# Patient Record
Sex: FEMALE | Race: WHITE | Employment: FULL TIME | URBAN - METROPOLITAN AREA
[De-identification: names, ages, dates, MRNs, and addresses within clinical notes are randomized per-mention and may not be internally consistent; named-entity substitution may affect disease eponyms.]

---

## 2017-02-23 ENCOUNTER — HOSPITAL ENCOUNTER (OUTPATIENT)
Dept: MAMMOGRAPHY | Age: 42
Discharge: HOME | End: 2017-02-23
Attending: OBSTETRICS & GYNECOLOGY
Payer: COMMERCIAL

## 2017-02-23 DIAGNOSIS — N63.20 LEFT BREAST MASS: ICD-10-CM

## 2017-02-23 PROCEDURE — 76642 ULTRASOUND BREAST LIMITED: CPT

## 2017-02-23 PROCEDURE — 77066 DX MAMMO INCL CAD BI: CPT

## 2017-03-18 NOTE — PROGRESS NOTES
Notify patient MMG and breast US  are nl, keep an eye on her SBE, let us know if anything changes or she has concerns

## 2018-02-22 ENCOUNTER — HOSPITAL ENCOUNTER (OUTPATIENT)
Dept: MAMMOGRAPHY | Age: 43
Discharge: HOME | End: 2018-02-22
Attending: OBSTETRICS & GYNECOLOGY
Payer: COMMERCIAL

## 2018-02-22 DIAGNOSIS — N63.0 BREAST MASS: ICD-10-CM

## 2018-02-22 DIAGNOSIS — Z12.39 SCREENING FOR BREAST CANCER: ICD-10-CM

## 2018-02-22 PROCEDURE — 76642 ULTRASOUND BREAST LIMITED: CPT

## 2018-02-22 PROCEDURE — 77066 DX MAMMO INCL CAD BI: CPT

## 2019-04-04 PROBLEM — E66.01 SEVERE OBESITY (HCC): Status: ACTIVE | Noted: 2019-04-04

## 2019-05-15 ENCOUNTER — HOSPITAL ENCOUNTER (OUTPATIENT)
Dept: MAMMOGRAPHY | Age: 44
Discharge: HOME | End: 2019-05-15
Attending: OBSTETRICS & GYNECOLOGY
Payer: COMMERCIAL

## 2019-05-15 DIAGNOSIS — Z12.31 VISIT FOR SCREENING MAMMOGRAM: ICD-10-CM

## 2019-05-15 PROCEDURE — 77063 BREAST TOMOSYNTHESIS BI: CPT

## 2020-01-15 ENCOUNTER — HOSPITAL ENCOUNTER (OUTPATIENT)
Dept: SURGERY | Age: 45
Discharge: HOME | End: 2020-01-15

## 2020-01-17 VITALS — HEIGHT: 62 IN | BODY MASS INDEX: 33.13 KG/M2 | WEIGHT: 180 LBS

## 2020-01-17 RX ORDER — LORATADINE 10 MG/1
10 TABLET ORAL DAILY
COMMUNITY

## 2020-01-17 NOTE — PERIOP NOTES
Patient verified name and     Order for consent not found in EHR. Type 2 surgery    Labs per surgeon: No orders received. Labs per anesthesia protocol: Hgb- to be done at GYN class. Type and Screen DOS. EKG: to be done at GYN class. Patient informed of GYN class on 20 at which time labs will be drawn. Patient will also receive all patient education and hospital approved surgical skin cleanser to use per hospital policy. Patient instructed to hold all vitamins 7 days prior to surgery and NSAIDS 5 days prior to surgery, patient verbalized understanding. Patient instructed to continue previous medications as prescribed prior to surgery and to take the following medications the day of surgery according to anesthesia guidelines with a small sip of water: albuterol inhaler (bring). Patient answered medical/surgical history questions at their best of ability. All prior to admission medications documented in [facility].

## 2020-01-21 NOTE — PERIOP NOTES
Enhanced Recovery After Surgery: Diabetic patients    Drink Glucerna- one bottle twice daily for five days beginning on 1/24/20 and stopping two  days prior to surgery 1/28/20. If Glucerna is not available, drink 1/2 bottle of Ensure  Enlive four times daily. Do not drink any Glucerna (or Ensure Enlive) the day  before surgery. It is recommended that you continue drinking this for one month  after surgery if ok with your physician. Follow your surgeon's instructions regarding diet in the days leading up to your surgery  and regarding any bowel preparation. The night before surgery 1/29/20, drink two bottles of the Ensure Pre-Surgery drink. The morning of surgery 1/30/20, drink one bottle of the Ensure Pre-Surgery drink while on  your way to the hospital. Drink this over 5-10 minutes. Drink nothing else after drinking the Ensure Pre-surgery drink the morning of surgery. Bring your patient handbook with you to the hospital.      Things to remember:    1. You will be up on a chair the evening of surgery and drinking clear liquids. Your diet will be advanced by your surgeon as appropriate. 2. Beginning the day after surgery, you will be up in a chair for all meals. 3. Beginning the day after surgery, you will be out of the bed for a minimum of 6 hours (not all at one time)    4. Beginning the day after surgery, you will walk in the dyer in the dyer at least 50' at least three times a day. 5. You will be given scheduled non-narcotic pain medication to help keep your pain under control. You will have stronger pain medication ordered for break through pain. 6. All of these measures are geared toward returning your bowel to normal function as soon as possible and to prevent complications associated with bowel blockage, blood clots, and/or pneumonia.

## 2020-01-22 ENCOUNTER — HOSPITAL ENCOUNTER (OUTPATIENT)
Dept: SURGERY | Age: 45
Discharge: HOME | End: 2020-01-22
Payer: COMMERCIAL

## 2020-01-22 ENCOUNTER — HOSPITAL ENCOUNTER (OUTPATIENT)
Dept: SURGERY | Age: 45
Discharge: HOME | End: 2020-01-22

## 2020-01-22 LAB
ATRIAL RATE: 84 BPM
CALCULATED P AXIS, ECG09: 65 DEGREES
CALCULATED R AXIS, ECG10: 61 DEGREES
CALCULATED T AXIS, ECG11: 59 DEGREES
DIAGNOSIS, 93000: NORMAL
HGB BLD-MCNC: 13.5 G/DL (ref 11.7–15.4)
P-R INTERVAL, ECG05: 104 MS
Q-T INTERVAL, ECG07: 364 MS
QRS DURATION, ECG06: 88 MS
QTC CALCULATION (BEZET), ECG08: 430 MS
VENTRICULAR RATE, ECG03: 84 BPM

## 2020-01-22 PROCEDURE — 36415 COLL VENOUS BLD VENIPUNCTURE: CPT

## 2020-01-22 PROCEDURE — 85018 HEMOGLOBIN: CPT

## 2020-01-22 NOTE — PERIOP NOTES
Lab results within limits per anesthesia protocol; OK for surgery.      Recent Results (from the past 12 hour(s))   HEMOGLOBIN    Collection Time: 01/22/20  8:10 AM   Result Value Ref Range    HGB 13.5 11.7 - 15.4 g/dL

## 2020-01-22 NOTE — PROGRESS NOTES
Patient attended [facility]/ GYN surgery orientation class today. Detailed instruction book regarding GYN surgery was provided at start of class. Class content included pre-operative instructions for surgery in the week prior to and day before surgery. Packet including Hibiclens and printed instructions on bathing was provided to patient. Detailed and printed diet instruction and presurgical drinks were also given to patient  in accordance with ERAS protocol. Detailed information was given regarding arriving at the hospital and instructions for the patient's day of surgery. Discussed recovery from surgery, hospital stay, pain management, and discharge. Reviewed recovery at home including pelvic rest, driving and activity restrictions, issues requiring call to physician etc. Cheryl Cap all questions in detail. Patient voices understanding of all.

## 2020-01-27 ENCOUNTER — HOSPITAL ENCOUNTER (EMERGENCY)
Age: 45
Discharge: STILL ADMITTED OR ADMINISTRATIVE | End: 2020-01-27

## 2020-01-28 ENCOUNTER — HOSPITAL ENCOUNTER (OUTPATIENT)
Dept: LAB | Age: 45
Discharge: HOME | End: 2020-01-28
Payer: COMMERCIAL

## 2020-01-28 LAB
EST. AVERAGE GLUCOSE BLD GHB EST-MCNC: 197 MG/DL
HBA1C MFR BLD: 8.5 %

## 2020-01-28 PROCEDURE — 83036 HEMOGLOBIN GLYCOSYLATED A1C: CPT

## 2020-01-28 PROCEDURE — 36415 COLL VENOUS BLD VENIPUNCTURE: CPT

## 2020-02-17 PROBLEM — E11.9 DIABETES (HCC): Status: ACTIVE | Noted: 2020-02-17

## 2020-05-27 ENCOUNTER — HOSPITAL ENCOUNTER (OUTPATIENT)
Dept: SURGERY | Age: 45
Discharge: HOME | End: 2020-05-27

## 2020-06-04 ENCOUNTER — HOSPITAL ENCOUNTER (OUTPATIENT)
Dept: SURGERY | Age: 45
Discharge: HOME | End: 2020-06-04

## 2020-06-08 VITALS — BODY MASS INDEX: 33.83 KG/M2 | HEIGHT: 63 IN

## 2020-06-08 NOTE — PERIOP NOTES
Enhanced Recovery After GYN Surgery: Diabetic patients    Drink Glucerna- one bottle twice daily for five days beginning on 7/2/20 and stopping two days prior to surgery 7/6/20 . If Glucerna is not available, drink 1/2 bottle of Ensure Enlive four times daily. Do not drink any Glucerna (or Ensure Enlive) the day before surgery. It is recommended that you continue drinking this for one month after surgery if ok with your physician. Follow your surgeon's instructions regarding diet in the days leading up to your surgery and regarding any bowel preparation. The night before surgery 7/7/20, drink two bottles of the Ensure Pre-Surgery drink. The morning of surgery 7/8/20, drink one bottle of the Ensure Pre-Surgery drink while on your way to the hospital. Drink this over 5-10 minutes. Drink nothing else after drinking the Ensure Pre-surgery drink the morning of surgery. Bring your patient handbook with you to the hospital.      Things to remember:    1. You will be given clear liquids to drink, advancing diet as tolerated    2. You will be up and moving around with assistance 2-4 hours after surgery. 3. You will be given regularly scheduled pain medications (NSAIDS, Tylenol, Gabapentin) with narcotics as needed. 4. You may be able to go home that night if the surgeon okays and you are up and eating and drinking. Otherwise, your discharge will be the following morning around lunch time. 5. Continue drinking Glucerna for 5 days after surgery.

## 2020-06-08 NOTE — PERIOP NOTES
Patient verified name and     Order for consent not found in EHR. Type 2 surgery    Labs per surgeon: orders not received. Labs per anesthesia protocol: Hgb at GYN class, Type and Screen DOS. EKG: Done 20- within anesthesia guidelines. Pt states previous surgery rescheduled due to abnormal HgbA1C. Pt has an appointment on 20 with endocrinologist (Billie Oneil) and states HgbA1C will redrawn. Will have charge nurse follow up. Patient informed a Covid swab is required 7 days prior to surgery. The testing center is located at the [address], [place]. For questions or concerns the patient is advised to call [phone]. An appointment is required and the testing clinic is closed from - for lunch and on weekends. Appointment date/time not found in EHR and provided to patient. Patient informed of GYN class on 6/10/20 at which time labs will be drawn. Patient will also receive all patient education and hospital approved surgical skin cleanser to use per hospital policy. Patient instructed to hold all vitamins 7 days prior to surgery and NSAIDS 5 days prior to surgery, patient verbalized understanding. Patient instructed to continue previous medications as prescribed prior to surgery and to take the following medications the day of surgery according to anesthesia guidelines with a small sip of water: claritin. Patient answered medical/surgical history questions at their best of ability. All prior to admission medications documented in [facility].

## 2020-06-09 NOTE — PROGRESS NOTES
Enhanced Recovery After GYN Surgery: Diabetic patients with neuropathy or other contraindications    Drink Glucerna- one bottle twice daily for five days beginning on 7/2/20 and stopping two  days prior to surgery 7/6/20. If Glucerna is not available, drink 1/2 bottle of Ensure  Enlive four times daily. Do not drink any Glucerna (or Ensure Enlive) the day  before surgery. It is recommended that you continue drinking this for one month  after surgery if ok with your physician. Follow your surgeon's instructions regarding diet in the days leading up to your surgery  and regarding any bowel preparation. The night before surgery 7/720, drink two bottles of the Ensure Pre-Surgery drink. Bring your patient handbook with you to the hospital.      Things to remember:    1. You will be given clear liquids to drink, advancing diet as tolerated    2. You will be up and moving around with assistance 2-4 hours after surgery. 3. You will be given regularly scheduled pain medications (NSAIDS, Tylenol, Gabapentin) with narcotics as needed. 4. You may be able to go home that night if the surgeon okays and you are up and eating and drinking. Otherwise, your discharge will be the following morning around lunch time. 5. Continue drinking Glucerna for 5 days after surgery.

## 2020-06-10 ENCOUNTER — HOSPITAL ENCOUNTER (OUTPATIENT)
Dept: SURGERY | Age: 45
Discharge: HOME | End: 2020-06-10
Attending: OBSTETRICS & GYNECOLOGY
Payer: COMMERCIAL

## 2020-06-10 LAB — HGB BLD-MCNC: 13.3 G/DL (ref 11.7–15.4)

## 2020-06-10 PROCEDURE — 36415 COLL VENOUS BLD VENIPUNCTURE: CPT

## 2020-06-10 PROCEDURE — 85018 HEMOGLOBIN: CPT

## 2020-06-10 NOTE — PROGRESS NOTES
Patient attended [facility]/ GYN surgery orientation appointment today. Detailed instruction book regarding GYN surgery was provided at start of class. Class content included pre-operative instructions for surgery in the week prior to and day before surgery. Packet including Hibiclens and printed instructions on bathing was provided to patient. Detailed and printed diet instruction and presurgical drinks were also given to patient  in accordance with ERAS protocol. Detailed information was given regarding arriving at the hospital and instructions for the patient's day of surgery. Discussed recovery from surgery, hospital stay, pain management, and discharge. Reviewed recovery at home including pelvic rest, driving and activity restrictions, issues requiring call to physician etc. Shayne Jaramillo all questions in detail. Patient voices understanding of all.

## 2020-06-11 NOTE — PERIOP NOTES
Lab results within limits per anesthesia protocol; OK for surgery.        Recent Results (from the past 24 hour(s))   HEMOGLOBIN    Collection Time: 06/10/20  3:19 PM   Result Value Ref Range    HGB 13.3 11.7 - 15.4 g/dL

## 2020-06-23 ENCOUNTER — TELEPHONE (OUTPATIENT)
Dept: NUTRITION | Age: 45
End: 2020-06-23

## 2020-06-24 NOTE — H&P (VIEW-ONLY)
Pre op  Exam    Onel Matamoros  40 y.o.  1975  [id number]    Presents for pre op  Patient is scheduled for TLH and bilateral salpingectomy    Originally scheduled for 2020, rescheduled due to  HgbA1C 8.5. Anesthesia, agreed surgery should be postponed until HgbA1C improved. Pt was taking Metformin, did not check BSs. Referred to endocrine. Pt advised that poorly controlled BSs increase her risk for infection and problems w/ healing. Long term CV/kidney sequelae also reviewed. Rescheduled for 3/2020 then 2020 (Covid -19 delayed/postponed)    Surgical indications:  Pelvic pain, IMB, dysmenorrhea, dyspareunia. 19 period:  Pain w/ overflow VB, golf ball sized clots.       2019 US:  US today:    RV, HETEROGENOUS UT 6.6/4/3.6 WITH AT LEAST 2 FIBROIDS THAT  APPEAR IN THE POSTERIOR WALL (DIFFICULT TO VISUALIZE THE  ENDOMETRIUM AND FUNDUS DUE TO UT POSITION. RT OV-WNL. LT OV- SIMPLE CYST=2.0 X 1.1CM.    2019 pap nl  2019 EMB nl     Endometrial/ colpo Biopsy results:  ENDOMETRIUM, BIOPSY:   SCANT BENIGN ENDOMETRIAL FRAGMENTS, MIXED WITH MINUTE FRAGMENTS OF BENIGN   ENDOCERVICAL GLANDS, MUCUS, AND BLOOD. NO HYPERPLASIA OR CARCINOMA.        No LMP recorded. The current method of family planning is OCP (estrogen/progesterone). Patient's last menstrual period was 2020. The current method of family planning is none ? ???    2019 pap nl.      Endometrial/ colpo Biopsy results:       OB History        1    Para   1    Term           1    AB        Living   2       SAB        TAB        Ectopic        Molar        Multiple        Live Births   1          Obstetric Comments   , boy, \"Zander\", , 2 lb 13 oz, 29 wk, abruption             Past Medical History:   Diagnosis Date    Abnormal Pap smear of cervix     LEEP    Diabetes ([facility] [address]     Type 2, Metformin and rybelsus, Average fasting- 100, Hypoglycemia signs at 79    Headache(784.0)        Past Surgical History:   Procedure Laterality Date    HX GYN  2007     X1    HX GYN  ~2000    LEEP    HX HEENT      tonsilectomy    HX WISDOM TEETH EXTRACTION  2004       Family History   Problem Relation Age of Onset    Heart Disease Mother     Other Mother         thyroid nodule    Heart Disease Maternal Grandmother     Heart Disease Maternal Grandfather     Hypertension Maternal Grandfather     Parkinsonism Father     Dementia Father         susy body dementia    Elevated Lipids Father     Breast Cancer Paternal Aunt         teenage diagnosis, ????    Hypertension Brother     Obesity Brother     Elevated Lipids Brother     Alzheimer Paternal Grandmother     Heart Disease Paternal Grandfather     Cancer Paternal Grandfather         brain tumor, [employer]    Diabetes Paternal Uncle     Prematurity Son     Colon Cancer Neg Hx     Ovarian Cancer Neg Hx     Prostate Cancer Neg Hx     Deep Vein Thrombosis Neg Hx     Pulmonary Embolism Neg Hx        Social History     Socioeconomic History    Marital status:      Spouse name: Not on file    Number of children: Not on file    Years of education: Not on file    Highest education level: Not on file   Occupational History    Not on file   Social Needs    Financial resource strain: Not on file    Food insecurity     Worry: Not on file     Inability: Not on file    Transportation needs     Medical: Not on file     Non-medical: Not on file   Tobacco Use    Smoking status: Never Smoker    Smokeless tobacco: Never Used   Substance and Sexual Activity    Alcohol use: No    Drug use: No    Sexual activity: Yes     Partners: Male     Birth control/protection: Pill   Lifestyle    Physical activity     Days per week: Not on file     Minutes per session: Not on file    Stress: Not on file   Relationships    Social connections     Talks on phone: Not on file     Gets together: Not on file     Attends Baptism service: Not on file Active member of club or organization: Not on file     Attends meetings of clubs or organizations: Not on file     Relationship status: Not on file    Intimate partner violence     Fear of current or ex partner: Not on file     Emotionally abused: Not on file     Physically abused: Not on file     Forced sexual activity: Not on file   Other Topics Concern    Not on file   Social History Narrative    1. PCP:  La Nolan ([facility])       Current Outpatient Medications   Medication Sig    Rybelsus 7 mg tablet Take 1 Tab by mouth Daily (before breakfast).  SUMAtriptan (IMITREX) 50 mg tablet Take 1 tablet at the first sign of a migraine, may repeat in 2 hours if symptoms do not improve. Do not exceed 200mg in a 24 hour period.  loratadine (CLARITIN) 10 mg tablet Take 10 mg by mouth daily.  metFORMIN (GLUCOPHAGE) 500 mg tablet Take 1 Tab by mouth daily (with dinner). No current facility-administered medications for this visit. Review of Systems:    Genitourinary: Positive for dyspareunia, menstrual problem and pelvic pain. IMB, dysmenorrhea     .gyn       Physical Exam:  Visit Vitals  /78   Ht 5' 3\" (1.6 m)   Wt 164 lb (74.4 kg)   LMP 06/01/2020   BMI 29.05 kg/m²       Claudia Gagnon female who appears pleasant, in no apparent distress, her given age, well developed, well nourished and with good attention to hygiene and body habitus. Oriented to person, place and time. Mood and affect normal and appropriate to situation. Head is normocephalic, atraumatic, without any gross head or neck masses. Neck: Neck exam reveals no abnormalities. Thyroid examination reveals no abnormalities. Lymph nodes:   Neck lymph nodes are normal.   No supraclavicular lymphadenopathy noted. No axillary lymphadenopathy noted. No groin lymphadenopathy noted. Respiratory: Assessment of respiratory effort reveals even and non labored respirations. Lungs CTA.    Cardiovascular: Heart auscultation reveals no murmurs, gallop, rubs or clicks. Skin: No skin rash, abnormal appearing nevi, subcutaneous nodules, lesions or ulcers observed. Abdomen: Abdomen soft, non tender, bowel sounds present x 4 without palpable masses. Examination for hernia is negative. Palpation of liver reveals no abnormalities with respect to size, tenderness or masses. Palpation of spleen reveals no abnormalities with respect to size, tenderness or masses. Breast: Symmetrical, no: dimpling, retractions, adenopathy, dominant masses or nipple discharge elicited. Breasts show no palpable masses or tenderness. Genitourinary:External genitalia are normal in appearance. Examination of urethral meatus reveals location normal and size normal.   Examination of urethra shows no abnormalities. Examination of vaginal vault reveals no abnormalities. Cervix: shows no pathology. Uterus: Uterine portion of bimanual exam reveals contour normal, shape regular and size normal.   Adnexa and parametria show no masses, tenderness, organomegaly or nodularity. Examination of anus and perineum shows no abnormalities. Rectal exam reveals normal sphincter tone without presence of hemorrhoids or masses. ASSESSMENT and PLAN  1. Pre op:  TLH with bilateral salpingectomy. Long discussion with patient. Risks, benefits and alternatives to surgery were discussed. Will try to keep her ovaries, consents to oophorectomy if deemed necessary intraop. Pelvic pain, typically Left side > right. Understands she may require HRT post op.      She prefers to keep her ovaries if possible. Patient has had a previous , she understands that complications aren't anticipated however if complications occur they could cause: pain, bleeding, infection, damage to surrounding structures including:  bowel, bladder, blood vessels and nerves. She understands that surgery carries with it a risk of VTE and death.   She understands that surgery could necessitate:  transfusion, SIN, need for additional surgery, including ureteral stint, prolonged jeff drainage, colostomy &/or other procedures as indicated. She also understands surgery could result in a change in bowel function, bladder function and sexual function. In addition there could be pain and infection. Pt understands and consents.      Plan to conserve her ovaries as long as they appear normal    Post op course  also discussed at length. Advised a 4 -6 wk recovery. Concerns regarding the amount of time necessary for adequate cuff healing and recovery of the strength/integrity of the vaginal cuff discussed. Pt advised I'd prefer she allow a full 6 wks of pelvic rest and minimal strenuous activity for the cuff to regain some reasonable strength. Plan one night in the hospital if all goes as planned. Discussed with patient the importance of minimizing her physical activity for several weeks postop, no heavy lifting, nothing >10 lbs, and to stay at pelvic rest (nothing in the vagina) until cleared postoperatively- usually at 6-8 weeks postop. All questions answered. 2.  Comorbidity:  DM, taking Rybelsus 7 mg every day & Metformin 500 bid. Recheck HgbA1C. Checking BS bid. Checks immediately prior to meals. FBS this am was 100, recent max . Feeling better, energy improved on current regimen. Going on vacation next week to [place], [place], plans to have her coronavirus screen done there will bring a copy of that result to our office and to the hospital.    , MARIANA, will accompany her          ICD-10-CM ICD-9-CM    1. Menometrorrhagia N92.1 626.2 CBC WITH AUTOMATED DIFF      HEMOGLOBIN A1C WITH EAG      METABOLIC PANEL, COMPREHENSIVE      CULTURE, URINE   2. Type II diabetes mellitus with complication, uncontrolled (HCC) E11.8 250.92 CBC WITH AUTOMATED DIFF    E11.65  HEMOGLOBIN A1C WITH EAG      METABOLIC PANEL, COMPREHENSIVE      CULTURE, URINE   3. Pre-procedure lab exam Z01.812 V72.63 CBC WITH AUTOMATED DIFF      HEMOGLOBIN A1C WITH EAG      METABOLIC PANEL, COMPREHENSIVE      CULTURE, URINE   4. Abnormal glucose R73.09 790.29 CBC WITH AUTOMATED DIFF      HEMOGLOBIN A1C WITH EAG      METABOLIC PANEL, COMPREHENSIVE      CULTURE, URINE   5. Preoperative testing Z01.818 V72.84 NOVEL CORONAVIRUS (COVID-19)   6. Dysmenorrhea N94.6 625.3    7. Dyspareunia, female N94.10 625.0    8. Pelvic pain R10.2 [id number]    9.  IMB (intermenstrual bleeding) N92.3 626.6        Orders Placed This Encounter    CULTURE, URINE    CBC WITH AUTOMATED DIFF    HEMOGLOBIN A1C WITH EAG    METABOLIC PANEL, COMPREHENSIVE    NOVEL CORONOAVIRUS (COVID-19) - PRE-OP     Signed by:  Joesph Butler MD, [id number]

## 2020-07-07 ENCOUNTER — ANESTHESIA EVENT (OUTPATIENT)
Dept: SURGERY | Age: 45
End: 2020-07-07
Payer: COMMERCIAL

## 2020-07-07 RX ORDER — SODIUM CHLORIDE, SODIUM LACTATE, POTASSIUM CHLORIDE, CALCIUM CHLORIDE 600; 310; 30; 20 MG/100ML; MG/100ML; MG/100ML; MG/100ML
25 INJECTION, SOLUTION INTRAVENOUS CONTINUOUS
Status: CANCELLED | OUTPATIENT
Start: 2020-07-07 | End: 2020-07-08

## 2020-07-07 NOTE — PERIOP NOTES
Pt. States she had Covid test completed on 7/1/2020 at [facility] in [place], [place]. Called [facility] about results. The pharmacist stated that they do not have any patient information on hand and they provide the patient with information to log into [employer] in order to view their results.

## 2020-07-08 ENCOUNTER — ANESTHESIA (OUTPATIENT)
Dept: SURGERY | Age: 45
End: 2020-07-08
Payer: COMMERCIAL

## 2020-07-09 ENCOUNTER — HOSPITAL ENCOUNTER (OUTPATIENT)
Dept: SURGERY | Age: 45
Discharge: HOME | End: 2020-07-09

## 2020-07-09 VITALS — BODY MASS INDEX: 29.06 KG/M2 | WEIGHT: 164 LBS | HEIGHT: 63 IN

## 2020-07-09 NOTE — PERIOP NOTES
Patient verified name and     Order for consent was found in EHR and matches case posting; patient verified. Type 2 surgery    Labs per surgeon: None; orders not received. Labs per anesthesia protocol: Hgb 13.7 2020  On 2020 patient had CBC and CMP results found in Results Review,  HbgA1c 5.6  EKG: Done 2020 and within anesthesia guidelines and found in Chart Review for anesthesia reference. Patient attended GYN class on 06/10/2020 and was provided all patient education and hospital approved surgical skin cleanser to use per hospital policy. States still has all instructions, skin cleanser and Ensure pre surgery drink and instruction of use. Patient does not have further questions. Please bring insurance card and photo ID on the DOS/procedure and to leave all jewelry and valuables at home. Verbalized understanding    Patient instructed to hold all vitamins 7 days prior to surgery and NSAIDS 5 days prior to surgery, patient verbalized understanding. Patient instructed to continue previous medications as prescribed prior to surgery and to take the following medications the day of surgery according to anesthesia guidelines with a small sip of water: Loratadine and Imitrex (if needed). Patient answered medical/surgical history questions at their best of ability. All prior to admission medications documented in [facility].

## 2020-07-15 RX ORDER — ACETAMINOPHEN 500 MG
1000 TABLET ORAL ONCE
Status: CANCELLED | OUTPATIENT
Start: 2020-07-15 | End: 2020-07-15

## 2020-07-16 ENCOUNTER — HOSPITAL ENCOUNTER (OUTPATIENT)
Age: 45
Setting detail: OBSERVATION
Discharge: HOME | End: 2020-07-17
Attending: OBSTETRICS & GYNECOLOGY | Admitting: OBSTETRICS & GYNECOLOGY
Payer: COMMERCIAL

## 2020-07-16 DIAGNOSIS — G89.18 POST-OP PAIN: Primary | ICD-10-CM

## 2020-07-16 PROBLEM — R10.2 PELVIC PAIN: Status: ACTIVE | Noted: 2020-07-16

## 2020-07-16 PROBLEM — N92.3 IMB (INTERMENSTRUAL BLEEDING): Status: ACTIVE | Noted: 2020-07-16

## 2020-07-16 PROBLEM — N94.10 DYSPAREUNIA IN FEMALE: Status: ACTIVE | Noted: 2020-07-16

## 2020-07-16 PROBLEM — N94.6 DYSMENORRHEA: Status: ACTIVE | Noted: 2020-07-16

## 2020-07-16 LAB
ABO + RH BLD: NORMAL
BLOOD GROUP ANTIBODIES SERPL: NORMAL
GLUCOSE BLD STRIP.AUTO-MCNC: 153 MG/DL (ref 65–100)
GLUCOSE BLD STRIP.AUTO-MCNC: 172 MG/DL (ref 65–100)
GLUCOSE BLD STRIP.AUTO-MCNC: 181 MG/DL (ref 65–100)
GLUCOSE BLD STRIP.AUTO-MCNC: 99 MG/DL (ref 65–100)
HCG UR QL: NEGATIVE
SPECIMEN EXP DATE BLD: NORMAL

## 2020-07-16 PROCEDURE — 77030039425 HC BLD LARYNG TRULITE DISP TELE -A: Performed by: ANESTHESIOLOGY

## 2020-07-16 PROCEDURE — 74011000250 HC RX REV CODE- 250: Performed by: NURSE ANESTHETIST, CERTIFIED REGISTERED

## 2020-07-16 PROCEDURE — 74011250637 HC RX REV CODE- 250/637: Performed by: ANESTHESIOLOGY

## 2020-07-16 PROCEDURE — 99218 HC RM OBSERVATION: CPT

## 2020-07-16 PROCEDURE — 74011250636 HC RX REV CODE- 250/636: Performed by: NURSE ANESTHETIST, CERTIFIED REGISTERED

## 2020-07-16 PROCEDURE — 76210000006 HC OR PH I REC 0.5 TO 1 HR: Performed by: OBSTETRICS & GYNECOLOGY

## 2020-07-16 PROCEDURE — 77030022703 HC LIGASURE  BLNT LAPSCP SEAL COVD -E: Performed by: OBSTETRICS & GYNECOLOGY

## 2020-07-16 PROCEDURE — 77030020829: Performed by: OBSTETRICS & GYNECOLOGY

## 2020-07-16 PROCEDURE — 77030022704 HC SUT VLOC COVD -B: Performed by: OBSTETRICS & GYNECOLOGY

## 2020-07-16 PROCEDURE — 77030040922 HC BLNKT HYPOTHRM STRY -A: Performed by: ANESTHESIOLOGY

## 2020-07-16 PROCEDURE — 77030008522 HC TBNG INSUF LAPRO STRY -B: Performed by: OBSTETRICS & GYNECOLOGY

## 2020-07-16 PROCEDURE — 74011250636 HC RX REV CODE- 250/636: Performed by: OBSTETRICS & GYNECOLOGY

## 2020-07-16 PROCEDURE — 86900 BLOOD TYPING SEROLOGIC ABO: CPT

## 2020-07-16 PROCEDURE — 74011250636 HC RX REV CODE- 250/636: Performed by: ANESTHESIOLOGY

## 2020-07-16 PROCEDURE — 77030018836 HC SOL IRR NACL ICUM -A: Performed by: OBSTETRICS & GYNECOLOGY

## 2020-07-16 PROCEDURE — 77030002933 HC SUT MCRYL J&J -A: Performed by: OBSTETRICS & GYNECOLOGY

## 2020-07-16 PROCEDURE — 77030031139 HC SUT VCRL2 J&J -A: Performed by: OBSTETRICS & GYNECOLOGY

## 2020-07-16 PROCEDURE — 77030010507 HC ADH SKN DERMBND J&J -B: Performed by: OBSTETRICS & GYNECOLOGY

## 2020-07-16 PROCEDURE — 76060000036 HC ANESTHESIA 2.5 TO 3 HR: Performed by: OBSTETRICS & GYNECOLOGY

## 2020-07-16 PROCEDURE — 81025 URINE PREGNANCY TEST: CPT

## 2020-07-16 PROCEDURE — 82962 GLUCOSE BLOOD TEST: CPT

## 2020-07-16 PROCEDURE — 77030040830 HC CATH URETH FOL MDII -A: Performed by: OBSTETRICS & GYNECOLOGY

## 2020-07-16 PROCEDURE — 77030003578 HC NDL INSUF VERES AMR -B: Performed by: OBSTETRICS & GYNECOLOGY

## 2020-07-16 PROCEDURE — 74011000250 HC RX REV CODE- 250: Performed by: OBSTETRICS & GYNECOLOGY

## 2020-07-16 PROCEDURE — 77030037088 HC TUBE ENDOTRACH ORAL NSL COVD-A: Performed by: ANESTHESIOLOGY

## 2020-07-16 PROCEDURE — 74011250637 HC RX REV CODE- 250/637: Performed by: OBSTETRICS & GYNECOLOGY

## 2020-07-16 PROCEDURE — 77030034154 HC SHR COAG HARM ACE J&J -F: Performed by: OBSTETRICS & GYNECOLOGY

## 2020-07-16 PROCEDURE — 77030008606 HC TRCR ENDOSC KII AMR -B: Performed by: OBSTETRICS & GYNECOLOGY

## 2020-07-16 PROCEDURE — 77030040361 HC SLV COMPR DVT MDII -B: Performed by: OBSTETRICS & GYNECOLOGY

## 2020-07-16 PROCEDURE — 77030037285 HC MANIP UTER DELINTR ADVINCULA DISP COOP -C: Performed by: OBSTETRICS & GYNECOLOGY

## 2020-07-16 PROCEDURE — 77030008756 HC TU IRR SUC STRY -B: Performed by: OBSTETRICS & GYNECOLOGY

## 2020-07-16 PROCEDURE — 88307 TISSUE EXAM BY PATHOLOGIST: CPT

## 2020-07-16 PROCEDURE — 76010000172 HC OR TIME 2.5 TO 3 HR INTENSV-TIER 1: Performed by: OBSTETRICS & GYNECOLOGY

## 2020-07-16 RX ORDER — GLYCOPYRROLATE 0.2 MG/ML
INJECTION INTRAMUSCULAR; INTRAVENOUS AS NEEDED
Status: DISCONTINUED | OUTPATIENT
Start: 2020-07-16 | End: 2020-07-16 | Stop reason: HOSPADM

## 2020-07-16 RX ORDER — KETAMINE HYDROCHLORIDE 50 MG/ML
INJECTION, SOLUTION INTRAMUSCULAR; INTRAVENOUS AS NEEDED
Status: DISCONTINUED | OUTPATIENT
Start: 2020-07-16 | End: 2020-07-16 | Stop reason: HOSPADM

## 2020-07-16 RX ORDER — GABAPENTIN 300 MG/1
300 CAPSULE ORAL 3 TIMES DAILY
Status: DISCONTINUED | OUTPATIENT
Start: 2020-07-16 | End: 2020-07-17 | Stop reason: HOSPADM

## 2020-07-16 RX ORDER — LORATADINE 10 MG/1
10 TABLET ORAL DAILY
Status: DISCONTINUED | OUTPATIENT
Start: 2020-07-17 | End: 2020-07-17 | Stop reason: HOSPADM

## 2020-07-16 RX ORDER — SODIUM CHLORIDE, SODIUM LACTATE, POTASSIUM CHLORIDE, CALCIUM CHLORIDE 600; 310; 30; 20 MG/100ML; MG/100ML; MG/100ML; MG/100ML
40 INJECTION, SOLUTION INTRAVENOUS CONTINUOUS
Status: DISPENSED | OUTPATIENT
Start: 2020-07-16 | End: 2020-07-17

## 2020-07-16 RX ORDER — NEOSTIGMINE METHYLSULFATE 1 MG/ML
INJECTION, SOLUTION INTRAVENOUS AS NEEDED
Status: DISCONTINUED | OUTPATIENT
Start: 2020-07-16 | End: 2020-07-16 | Stop reason: HOSPADM

## 2020-07-16 RX ORDER — CELECOXIB 100 MG/1
100 CAPSULE ORAL 2 TIMES DAILY
Status: DISCONTINUED | OUTPATIENT
Start: 2020-07-17 | End: 2020-07-17 | Stop reason: HOSPADM

## 2020-07-16 RX ORDER — OXYCODONE HYDROCHLORIDE 5 MG/1
5 TABLET ORAL
Status: DISCONTINUED | OUTPATIENT
Start: 2020-07-16 | End: 2020-07-16 | Stop reason: HOSPADM

## 2020-07-16 RX ORDER — GABAPENTIN 300 MG/1
300 CAPSULE ORAL ONCE
Status: COMPLETED | OUTPATIENT
Start: 2020-07-16 | End: 2020-07-16

## 2020-07-16 RX ORDER — ONDANSETRON 2 MG/ML
4 INJECTION INTRAMUSCULAR; INTRAVENOUS
Status: DISCONTINUED | OUTPATIENT
Start: 2020-07-16 | End: 2020-07-17 | Stop reason: HOSPADM

## 2020-07-16 RX ORDER — LORATADINE 10 MG/1
10 TABLET ORAL DAILY
Status: DISCONTINUED | OUTPATIENT
Start: 2020-07-16 | End: 2020-07-16

## 2020-07-16 RX ORDER — HYDROMORPHONE HYDROCHLORIDE 1 MG/ML
1 INJECTION, SOLUTION INTRAMUSCULAR; INTRAVENOUS; SUBCUTANEOUS
Status: DISCONTINUED | OUTPATIENT
Start: 2020-07-16 | End: 2020-07-17 | Stop reason: HOSPADM

## 2020-07-16 RX ORDER — OXYCODONE HYDROCHLORIDE 5 MG/1
5-10 TABLET ORAL
Status: DISCONTINUED | OUTPATIENT
Start: 2020-07-16 | End: 2020-07-17 | Stop reason: HOSPADM

## 2020-07-16 RX ORDER — ZOLPIDEM TARTRATE 5 MG/1
5 TABLET ORAL
Status: DISCONTINUED | OUTPATIENT
Start: 2020-07-16 | End: 2020-07-17 | Stop reason: HOSPADM

## 2020-07-16 RX ORDER — HYDROMORPHONE HYDROCHLORIDE 2 MG/ML
0.5 INJECTION, SOLUTION INTRAMUSCULAR; INTRAVENOUS; SUBCUTANEOUS
Status: DISCONTINUED | OUTPATIENT
Start: 2020-07-16 | End: 2020-07-16 | Stop reason: HOSPADM

## 2020-07-16 RX ORDER — SODIUM CHLORIDE 0.9 % (FLUSH) 0.9 %
5-40 SYRINGE (ML) INJECTION AS NEEDED
Status: DISCONTINUED | OUTPATIENT
Start: 2020-07-16 | End: 2020-07-17 | Stop reason: HOSPADM

## 2020-07-16 RX ORDER — DOCUSATE SODIUM 100 MG/1
100 CAPSULE, LIQUID FILLED ORAL 2 TIMES DAILY
Status: DISCONTINUED | OUTPATIENT
Start: 2020-07-16 | End: 2020-07-17 | Stop reason: HOSPADM

## 2020-07-16 RX ORDER — BUPIVACAINE HYDROCHLORIDE 5 MG/ML
INJECTION, SOLUTION EPIDURAL; INTRACAUDAL AS NEEDED
Status: DISCONTINUED | OUTPATIENT
Start: 2020-07-16 | End: 2020-07-16 | Stop reason: HOSPADM

## 2020-07-16 RX ORDER — SCOLOPAMINE TRANSDERMAL SYSTEM 1 MG/1
1 PATCH, EXTENDED RELEASE TRANSDERMAL ONCE
Status: DISCONTINUED | OUTPATIENT
Start: 2020-07-16 | End: 2020-07-17 | Stop reason: HOSPADM

## 2020-07-16 RX ORDER — SODIUM CHLORIDE 0.9 % (FLUSH) 0.9 %
5-40 SYRINGE (ML) INJECTION EVERY 8 HOURS
Status: DISCONTINUED | OUTPATIENT
Start: 2020-07-16 | End: 2020-07-16 | Stop reason: HOSPADM

## 2020-07-16 RX ORDER — LIDOCAINE HYDROCHLORIDE 10 MG/ML
0.1 INJECTION INFILTRATION; PERINEURAL AS NEEDED
Status: DISCONTINUED | OUTPATIENT
Start: 2020-07-16 | End: 2020-07-16 | Stop reason: HOSPADM

## 2020-07-16 RX ORDER — DIPHENHYDRAMINE HCL 25 MG
25 CAPSULE ORAL
Status: DISCONTINUED | OUTPATIENT
Start: 2020-07-16 | End: 2020-07-17 | Stop reason: HOSPADM

## 2020-07-16 RX ORDER — KETOROLAC TROMETHAMINE 30 MG/ML
INJECTION, SOLUTION INTRAMUSCULAR; INTRAVENOUS AS NEEDED
Status: DISCONTINUED | OUTPATIENT
Start: 2020-07-16 | End: 2020-07-16 | Stop reason: HOSPADM

## 2020-07-16 RX ORDER — GABAPENTIN 300 MG/1
300 CAPSULE ORAL ONCE
Status: DISCONTINUED | OUTPATIENT
Start: 2020-07-16 | End: 2020-07-16 | Stop reason: SDUPTHER

## 2020-07-16 RX ORDER — METFORMIN HYDROCHLORIDE 500 MG/1
500 TABLET ORAL
Status: DISCONTINUED | OUTPATIENT
Start: 2020-07-16 | End: 2020-07-17 | Stop reason: HOSPADM

## 2020-07-16 RX ORDER — SODIUM CHLORIDE 0.9 % (FLUSH) 0.9 %
5-40 SYRINGE (ML) INJECTION EVERY 8 HOURS
Status: DISCONTINUED | OUTPATIENT
Start: 2020-07-16 | End: 2020-07-17 | Stop reason: HOSPADM

## 2020-07-16 RX ORDER — SUMATRIPTAN 50 MG/1
50 TABLET, FILM COATED ORAL
Status: DISPENSED | OUTPATIENT
Start: 2020-07-16 | End: 2020-07-17

## 2020-07-16 RX ORDER — NALOXONE HYDROCHLORIDE 0.4 MG/ML
0.4 INJECTION, SOLUTION INTRAMUSCULAR; INTRAVENOUS; SUBCUTANEOUS AS NEEDED
Status: DISCONTINUED | OUTPATIENT
Start: 2020-07-16 | End: 2020-07-17 | Stop reason: HOSPADM

## 2020-07-16 RX ORDER — SODIUM CHLORIDE 0.9 % (FLUSH) 0.9 %
5-40 SYRINGE (ML) INJECTION AS NEEDED
Status: DISCONTINUED | OUTPATIENT
Start: 2020-07-16 | End: 2020-07-16 | Stop reason: HOSPADM

## 2020-07-16 RX ORDER — PROPOFOL 10 MG/ML
INJECTION, EMULSION INTRAVENOUS AS NEEDED
Status: DISCONTINUED | OUTPATIENT
Start: 2020-07-16 | End: 2020-07-16 | Stop reason: HOSPADM

## 2020-07-16 RX ORDER — FENTANYL CITRATE 50 UG/ML
INJECTION, SOLUTION INTRAMUSCULAR; INTRAVENOUS AS NEEDED
Status: DISCONTINUED | OUTPATIENT
Start: 2020-07-16 | End: 2020-07-16 | Stop reason: HOSPADM

## 2020-07-16 RX ORDER — ATROPA BELLADONNA AND OPIUM 16.2; 6 MG/1; MG/1
SUPPOSITORY RECTAL AS NEEDED
Status: DISCONTINUED | OUTPATIENT
Start: 2020-07-16 | End: 2020-07-16 | Stop reason: HOSPADM

## 2020-07-16 RX ORDER — ACETAMINOPHEN 500 MG
1000 TABLET ORAL EVERY 6 HOURS
Status: DISCONTINUED | OUTPATIENT
Start: 2020-07-16 | End: 2020-07-17 | Stop reason: HOSPADM

## 2020-07-16 RX ORDER — KETOROLAC TROMETHAMINE 30 MG/ML
30 INJECTION, SOLUTION INTRAMUSCULAR; INTRAVENOUS EVERY 6 HOURS
Status: COMPLETED | OUTPATIENT
Start: 2020-07-16 | End: 2020-07-17

## 2020-07-16 RX ORDER — ONDANSETRON 2 MG/ML
INJECTION INTRAMUSCULAR; INTRAVENOUS AS NEEDED
Status: DISCONTINUED | OUTPATIENT
Start: 2020-07-16 | End: 2020-07-16 | Stop reason: HOSPADM

## 2020-07-16 RX ORDER — MIDAZOLAM HYDROCHLORIDE 1 MG/ML
2 INJECTION, SOLUTION INTRAMUSCULAR; INTRAVENOUS
Status: COMPLETED | OUTPATIENT
Start: 2020-07-16 | End: 2020-07-16

## 2020-07-16 RX ORDER — ROCURONIUM BROMIDE 10 MG/ML
INJECTION, SOLUTION INTRAVENOUS AS NEEDED
Status: DISCONTINUED | OUTPATIENT
Start: 2020-07-16 | End: 2020-07-16 | Stop reason: HOSPADM

## 2020-07-16 RX ORDER — CELECOXIB 200 MG/1
200 CAPSULE ORAL ONCE
Status: COMPLETED | OUTPATIENT
Start: 2020-07-16 | End: 2020-07-16

## 2020-07-16 RX ORDER — LIDOCAINE HYDROCHLORIDE 20 MG/ML
INJECTION, SOLUTION EPIDURAL; INFILTRATION; INTRACAUDAL; PERINEURAL AS NEEDED
Status: DISCONTINUED | OUTPATIENT
Start: 2020-07-16 | End: 2020-07-16 | Stop reason: HOSPADM

## 2020-07-16 RX ORDER — ONDANSETRON 8 MG/1
8 TABLET, ORALLY DISINTEGRATING ORAL
Status: DISCONTINUED | OUTPATIENT
Start: 2020-07-16 | End: 2020-07-17 | Stop reason: HOSPADM

## 2020-07-16 RX ORDER — ACETAMINOPHEN 500 MG
1000 TABLET ORAL ONCE
Status: COMPLETED | OUTPATIENT
Start: 2020-07-16 | End: 2020-07-16

## 2020-07-16 RX ORDER — DEXAMETHASONE SODIUM PHOSPHATE 4 MG/ML
INJECTION, SOLUTION INTRA-ARTICULAR; INTRALESIONAL; INTRAMUSCULAR; INTRAVENOUS; SOFT TISSUE AS NEEDED
Status: DISCONTINUED | OUTPATIENT
Start: 2020-07-16 | End: 2020-07-16 | Stop reason: HOSPADM

## 2020-07-16 RX ORDER — CEFAZOLIN SODIUM/WATER 2 G/20 ML
2 SYRINGE (ML) INTRAVENOUS ONCE
Status: COMPLETED | OUTPATIENT
Start: 2020-07-16 | End: 2020-07-16

## 2020-07-16 RX ORDER — SODIUM CHLORIDE, SODIUM LACTATE, POTASSIUM CHLORIDE, CALCIUM CHLORIDE 600; 310; 30; 20 MG/100ML; MG/100ML; MG/100ML; MG/100ML
75 INJECTION, SOLUTION INTRAVENOUS CONTINUOUS
Status: DISCONTINUED | OUTPATIENT
Start: 2020-07-16 | End: 2020-07-16 | Stop reason: HOSPADM

## 2020-07-16 RX ORDER — OXYCODONE AND ACETAMINOPHEN 10; 325 MG/1; MG/1
1 TABLET ORAL AS NEEDED
Status: DISCONTINUED | OUTPATIENT
Start: 2020-07-16 | End: 2020-07-16 | Stop reason: HOSPADM

## 2020-07-16 RX ORDER — EPHEDRINE SULFATE/0.9% NACL/PF 50 MG/5 ML
SYRINGE (ML) INTRAVENOUS AS NEEDED
Status: DISCONTINUED | OUTPATIENT
Start: 2020-07-16 | End: 2020-07-16 | Stop reason: HOSPADM

## 2020-07-16 RX ADMIN — ROCURONIUM BROMIDE 10 MG: 10 INJECTION, SOLUTION INTRAVENOUS at 08:09

## 2020-07-16 RX ADMIN — MIDAZOLAM 2 MG: 1 INJECTION INTRAMUSCULAR; INTRAVENOUS at 06:32

## 2020-07-16 RX ADMIN — KETAMINE HYDROCHLORIDE 25 MG: 50 INJECTION INTRAMUSCULAR; INTRAVENOUS at 07:28

## 2020-07-16 RX ADMIN — SODIUM CHLORIDE, SODIUM LACTATE, POTASSIUM CHLORIDE, AND CALCIUM CHLORIDE 25 ML/HR: 600; 310; 30; 20 INJECTION, SOLUTION INTRAVENOUS at 06:02

## 2020-07-16 RX ADMIN — ZOLPIDEM TARTRATE 5 MG: 5 TABLET ORAL at 22:27

## 2020-07-16 RX ADMIN — METFORMIN HYDROCHLORIDE 500 MG: 500 TABLET ORAL at 16:11

## 2020-07-16 RX ADMIN — KETAMINE HYDROCHLORIDE 25 MG: 50 INJECTION INTRAMUSCULAR; INTRAVENOUS at 08:25

## 2020-07-16 RX ADMIN — Medication 10 ML: at 16:12

## 2020-07-16 RX ADMIN — KETOROLAC TROMETHAMINE 30 MG: 30 INJECTION, SOLUTION INTRAMUSCULAR; INTRAVENOUS at 09:37

## 2020-07-16 RX ADMIN — LIDOCAINE HYDROCHLORIDE 100 MG: 20 INJECTION, SOLUTION EPIDURAL; INFILTRATION; INTRACAUDAL; PERINEURAL at 07:28

## 2020-07-16 RX ADMIN — GABAPENTIN 300 MG: 300 CAPSULE ORAL at 16:11

## 2020-07-16 RX ADMIN — DOCUSATE SODIUM 100 MG: 100 CAPSULE, LIQUID FILLED ORAL at 16:11

## 2020-07-16 RX ADMIN — DEXAMETHASONE SODIUM PHOSPHATE 10 MG: 4 INJECTION, SOLUTION INTRAMUSCULAR; INTRAVENOUS at 08:37

## 2020-07-16 RX ADMIN — FENTANYL CITRATE 50 MCG: 50 INJECTION INTRAMUSCULAR; INTRAVENOUS at 07:28

## 2020-07-16 RX ADMIN — SODIUM CHLORIDE, SODIUM LACTATE, POTASSIUM CHLORIDE, AND CALCIUM CHLORIDE 40 ML/HR: 600; 310; 30; 20 INJECTION, SOLUTION INTRAVENOUS at 11:34

## 2020-07-16 RX ADMIN — ACETAMINOPHEN 1000 MG: 500 TABLET, FILM COATED ORAL at 16:18

## 2020-07-16 RX ADMIN — ACETAMINOPHEN 1000 MG: 500 TABLET, FILM COATED ORAL at 11:30

## 2020-07-16 RX ADMIN — KETOROLAC TROMETHAMINE 30 MG: 30 INJECTION, SOLUTION INTRAMUSCULAR at 16:11

## 2020-07-16 RX ADMIN — ACETAMINOPHEN 1000 MG: 500 TABLET, FILM COATED ORAL at 05:52

## 2020-07-16 RX ADMIN — GABAPENTIN 300 MG: 300 CAPSULE ORAL at 22:27

## 2020-07-16 RX ADMIN — ONDANSETRON 4 MG: 2 INJECTION INTRAMUSCULAR; INTRAVENOUS at 09:30

## 2020-07-16 RX ADMIN — DOCUSATE SODIUM 100 MG: 100 CAPSULE, LIQUID FILLED ORAL at 11:29

## 2020-07-16 RX ADMIN — ROCURONIUM BROMIDE 10 MG: 10 INJECTION, SOLUTION INTRAVENOUS at 08:41

## 2020-07-16 RX ADMIN — GLYCOPYRROLATE 0.4 MG: 0.2 INJECTION, SOLUTION INTRAMUSCULAR; INTRAVENOUS at 09:42

## 2020-07-16 RX ADMIN — PROPOFOL 150 MG: 10 INJECTION, EMULSION INTRAVENOUS at 07:28

## 2020-07-16 RX ADMIN — Medication 10 MG: at 08:04

## 2020-07-16 RX ADMIN — CELECOXIB 200 MG: 200 CAPSULE ORAL at 05:52

## 2020-07-16 RX ADMIN — Medication 2 G: at 07:16

## 2020-07-16 RX ADMIN — Medication 3 MG: at 09:42

## 2020-07-16 RX ADMIN — FENTANYL CITRATE 50 MCG: 50 INJECTION INTRAMUSCULAR; INTRAVENOUS at 08:07

## 2020-07-16 RX ADMIN — KETOROLAC TROMETHAMINE 30 MG: 30 INJECTION, SOLUTION INTRAMUSCULAR at 22:27

## 2020-07-16 RX ADMIN — GABAPENTIN 300 MG: 300 CAPSULE ORAL at 05:52

## 2020-07-16 RX ADMIN — OXYCODONE 10 MG: 5 TABLET ORAL at 11:29

## 2020-07-16 RX ADMIN — ROCURONIUM BROMIDE 40 MG: 10 INJECTION, SOLUTION INTRAVENOUS at 07:28

## 2020-07-16 NOTE — PERIOP NOTES
Patient drank Pre-Surgical drink as instructed:   2 Pre Surgical drinks before midnight and Pre Surgical drink consumed over 5-10 minutes PTA DOS- Pt states drank half doses due to drink \"too sweet\"    Warmer placed on patient's bed. Warm IV fluids infusing in pre-op per ERAS protocol.

## 2020-07-16 NOTE — OP NOTES
Cuba Pak  [id number]  1975    pre op dx:   Pelvic pain, IMB, dysmenorrhea, dyspareunia. post op dx: CELESTE  Procedure:  [address]  Surgeon: Maya  Assistant:    Lyndal Sever. An assistant was necessary in order to optimize exposure during the [address]. Once the colpotomy is started it is difficult to maintain pneumoperitoneum and a skilled assistant facilitates final dissection/removal of the right lateral portion of the specimen in a timely and safe fashion while taking steps to maintain pneumoperitoneum. EBL: 20 cc  Anesthesia:  GETA  Findings:  Uterus globular approximately 12 week size c/w adenomyosis, bilateral ovaries had simple cysts that were drained, bilateral FTs nl appearing. Normal appearing:  appendix, liver & GB. Bilateral ovarian fossa--->petechial changes and white tissue c/w possible endometriosis. Of note the bilateral ureters were identified early in the case and appeared to be remote from the operative field. They were noted to peristalse at multiple intervals throughout the case and were inspected a final time at the completion of the case and again noted to peristalse. Specimens:  Uterus to path  Complications:  none  misc: urine clear throughout the case    Procedure  After obtaining informed consent pt was taken to the OR where anesthesia was obtained via GETA. Positioned in the dorsal, supine position. Abdomen & vagina prepped and draped in the usual sterile fashion. A \"V care\" uterine manipulator was placed after sounding the uterus to 9 cm. I changed my gloves and attention was turned to the abdominal portion of the case. An infraumbilical anticipated trocar site was locally infiltrated with 0.5% marcaine. Incision was created sharply & the subcutaneous tissue further developed using hemostats. Of note, all trocar sites were locally infiltrated in with 0.5% marcaine without epinephrine. A veress needle was used to access the pelvis.   Peritoneal placement was confirmed Using: negative aspiration, the hanging drop method and noting pressures in the 6 mm Hg range or less. Pelvis was insufflated with CO2 gas. A 5mm excel trocar was placed under direct visualization. Two lateral 5mm trocar sites were also established under direct visualization in a similar fashion. A suprapubic trocar site was established using an 11mm trocar again under direct visualization. The 5 mm 0 degree laparoscope was placed under direct visualization. The patient was placed in trendelenburg and the pelvis was surveyed in a panoramic fashion with above findings noted. The harmonic scalpel was used. The left uteroovarian ligament was clamped and desiccated using the harmonic scalpel on minimum power, excellent hemostasis was noted. Please note the harmonic scalpel was used on minimum power throughout the case unless otherwise noted. The left:  mesovarian tissue and round ligament were likewise clamped and desiccated using the harmonic scalpel. The left anterior leaf of the broad ligament was then superficially incised to the midline of  the bladder refection. The posterior leaf was then skeletonized under direct visualization. The ascending branch of the uterine vessels was then clamped and the uterus relaxed as well as the ACE. The ACE harmonic was then activated on minimum power and the tissue desiccated in the usual fashion. The ascending branch of the uterine vessels was desiccated @ three sites taking care to stay medial in an effort to reduce the pulse pressure @ the artery. An identical procedure was then performed on the right by Sampson Slade. The vesicouterine reflection was further mobilized in a caudad direction using the harmonic and a laparoscopic peanut. The cardinal ligaments were then  clamped and desiccated in a  sequential fashion. The cone shaped bell of the V care uterine manipulator was easily palpable through the tissue of the upper vagina.    An anterior colpotomy was made using the hot blade of the ACE. The cone shaped bell of the V care uterine manipulator was used as a guide in creating the anterior colpotomy. The vagina was then further opened up using the ACE in a sequential fashion. A single tooth tenaculum uterine manipulator was used to elevate the uterus out of the pelvis thus facilitating visualization and exposure while dissecting out the cardinal and uterosacral ligaments. The single tooth uterine manipulator was also effective in grasping the leading edge of the anterior and  posterior vagina. The procedure was performed on the pt's left starting with the cardinal ligaments and proceeding across the anterior uterus. Ultimately the tissue was desiccated to the  base of the broad ligament. Care was taken to stay medial to the uterus while taking down the cardinal ligaments & uterosacral ligaments. Once the specimen was freed up entirely the V care uterine manipulator was used to remove the specimen vaginally . A single tooth tenaculum was used to grasp the cervix as the specimen was pulled out through the vagina to improve my  grasp on the tissue and ensure the cervix didn't tear away from the specimen. The pelvis was irrigated and excellent hemostasis noted. The vaginal cuff was then closed using 0 vicryl interrupted figure of eights. Care was taken to  Close the bilateral angles first followed by the intervening remainder of the vaginal cuff. The pelvis was again copiously irrigated and excellent hemostasis noted. A simple cyst on the left ovary was drained using the hot blade of the harmonic it was drilled open. Clear fluid drained readily and the cyst decompressed immediately. A similar procedure was performed on a simple appearing cyst on the pt's right. The pelvis was again copiously irrigated and excellent hemostasis noted.   Of note the bilateral ureters were identified early in the case and appeared to be remote from the operative field.  They were noted to peristalse at multiple intervals throughout the case and were inspected a final time at the completion of the case and again noted to peristalse. The suprapubic 11 mm trocar site was closed using the fascial closure device with 0 V in a figure of eight fashion. The trocar was subsequently removed under direct visualization and the defect closed as the suture was secured. The remaining trocars were removed under direct visualization and the pelvis desufflated. Pt was taken out of trendelenburg and each trocar skin site reapproximated using 4-0 V  On a GI needle. A sponge on a stick had been placed in the vagina and it was inspected at the completion of the case and very little blood was noted on the sponge. Pt tolerated the procedure without complication. All counts were correct times two. The urine was clear throughout the case. Pt was transported to the recovery room in stable condition. I spoke with the patient's  and friend post operatively and events of surgery were reviewed.

## 2020-07-16 NOTE — ANESTHESIA PREPROCEDURE EVALUATION
Relevant Problems   No relevant active problems       Anesthetic History   No history of anesthetic complications            Review of Systems / Medical History  Patient summary reviewed and pertinent labs reviewed    Pulmonary  Within defined limits                 Neuro/Psych   Within defined limits      Headaches     Cardiovascular                  Exercise tolerance: >4 METS     GI/Hepatic/Renal  Within defined limits              Endo/Other    Diabetes: well controlled, type 2    Morbid obesity     Other Findings            Physical Exam    Airway  Mallampati: II  TM Distance: > 6 cm  Neck ROM: normal range of motion   Mouth opening: Normal     Cardiovascular    Rhythm: regular           Dental  No notable dental hx       Pulmonary                 Abdominal  GI exam deferred       Other Findings            Anesthetic Plan    ASA: 3  Anesthesia type: general          Induction: Intravenous  Anesthetic plan and risks discussed with: Patient

## 2020-07-16 NOTE — PROGRESS NOTES
07/16/20 1122   Dual Skin Pressure Injury Assessment   Dual Skin Pressure Injury Assessment WDL   Second Care Provider (Based on [address] [place] [address]) Ruth Brand RN   Skin Integumentary   Skin Integumentary (WDL) WDL    Pressure  Injury Documentation No Pressure Injury Noted-Pressure Ulcer Prevention Initiated   Skin Integrity Incision (comment)  (4 abd)   Skin Color Appropriate for ethnicity   Skin Condition/Temp Dry; Warm   Turgor Non-tenting   Varicosities Absent     Dual akin assessment preformed with Ruth Brand RN. No pressure injuries noted.

## 2020-07-16 NOTE — PROGRESS NOTES
TRANSFER - IN REPORT:    Verbal report received from Princess Pascal RN(name) on [employer]  being received from [place]) for routine post - op      Report consisted of patients Situation, Background, Assessment and   Recommendations(SBAR). Information from the following report(s) SBAR, Kardex, OR Summary and MAR was reviewed with the receiving nurse. Opportunity for questions and clarification was provided. Assessment completed upon patients arrival to unit and care assumed.

## 2020-07-16 NOTE — PROGRESS NOTES
1830-END OF SHIFT NOTE:    -pt ambulating hallways with   -voiding well, pain controlled  -tolerating intake well  -vss, no needs voiced at this time     Intake/Output  07/16 0701 - 07/16 1900  In: 4407 [P.O.:1000; I.V.:2158]  Out: 1550 [Urine:1500]   Voiding: YES  Catheter: NO  Drain:          Stool:  0 occurrences. Emesis:  0 occurrences. VITAL SIGNS  Patient Vitals for the past 12 hrs:   Temp Pulse Resp BP SpO2   07/16/20 1549 98.3 °F (36.8 °C) 78 17 125/82 97 %   07/16/20 1122 97.8 °F (36.6 °C) 87 15 135/88 97 %   07/16/20 1035  79 14 122/65 100 %   07/16/20 1020  73 14 125/76 100 %   07/16/20 1015  79 14 121/65 100 %   07/16/20 1010  92 15 121/67 100 %   07/16/20 1005 98 °F (36.7 °C) (!) 112 16 129/69 100 %       Pain Assessment  Pain 1  Pain Scale 1: Numeric (0 - 10) (07/16/20 1220)  Pain Intensity 1: 3 (07/16/20 1220)  Patient Stated Pain Goal: 0 (07/16/20 1220)  Pain Reassessment 1: Yes (07/16/20 1220)  Pain Onset 1: post op (07/16/20 1122)  Pain Location 1: Abdomen (07/16/20 1122)  Pain Orientation 1: Mid (07/16/20 1122)  Pain Description 1: Aching (07/16/20 1122)  Pain Intervention(s) 1: Medication (see MAR) (07/16/20 1122)    Ambulating  Yes    Additional Information:     Shift report given to oncoming nurse at the bedside.     Braulio Hernandez RN

## 2020-07-16 NOTE — PERIOP NOTES
TRANSFER - OUT REPORT:    Verbal report given to receiving nurse Amy(name) on Komal Me being transferred to [facility](unit) for routine progression of care       Report consisted of patient's Situation, Background, Assessment and   Recommendations(SBAR). Information from the following report(s) OR Summary, Intake/Output and MAR was reviewed with the receiving nurse. Opportunity for questions and clarification was provided.       Patient transported with:   [employer]

## 2020-07-17 VITALS
RESPIRATION RATE: 18 BRPM | DIASTOLIC BLOOD PRESSURE: 82 MMHG | OXYGEN SATURATION: 96 % | WEIGHT: 159 LBS | HEIGHT: 63 IN | TEMPERATURE: 98.2 F | BODY MASS INDEX: 28.17 KG/M2 | SYSTOLIC BLOOD PRESSURE: 122 MMHG | HEART RATE: 100 BPM

## 2020-07-17 LAB
CREAT SERPL-MCNC: 0.67 MG/DL (ref 0.6–1)
GLUCOSE BLD STRIP.AUTO-MCNC: 101 MG/DL (ref 65–100)
GLUCOSE BLD STRIP.AUTO-MCNC: 103 MG/DL (ref 65–100)
HGB BLD-MCNC: 11.9 G/DL (ref 11.7–15.4)

## 2020-07-17 PROCEDURE — 74011250637 HC RX REV CODE- 250/637: Performed by: OBSTETRICS & GYNECOLOGY

## 2020-07-17 PROCEDURE — 74011250636 HC RX REV CODE- 250/636: Performed by: OBSTETRICS & GYNECOLOGY

## 2020-07-17 PROCEDURE — 99218 HC RM OBSERVATION: CPT

## 2020-07-17 PROCEDURE — 85018 HEMOGLOBIN: CPT

## 2020-07-17 PROCEDURE — 82565 ASSAY OF CREATININE: CPT

## 2020-07-17 PROCEDURE — 36415 COLL VENOUS BLD VENIPUNCTURE: CPT

## 2020-07-17 PROCEDURE — 82962 GLUCOSE BLOOD TEST: CPT

## 2020-07-17 RX ORDER — IBUPROFEN 800 MG/1
800 TABLET ORAL EVERY 8 HOURS
Qty: 30 TAB | Refills: 0 | Status: SHIPPED | OUTPATIENT
Start: 2020-07-17

## 2020-07-17 RX ORDER — OXYCODONE HYDROCHLORIDE 5 MG/1
5 TABLET ORAL
Qty: 20 TAB | Refills: 0 | Status: SHIPPED | OUTPATIENT
Start: 2020-07-17 | End: 2020-07-24

## 2020-07-17 RX ADMIN — SODIUM CHLORIDE, SODIUM LACTATE, POTASSIUM CHLORIDE, AND CALCIUM CHLORIDE 40 ML/HR: 600; 310; 30; 20 INJECTION, SOLUTION INTRAVENOUS at 09:33

## 2020-07-17 RX ADMIN — ACETAMINOPHEN 1000 MG: 500 TABLET, FILM COATED ORAL at 13:03

## 2020-07-17 RX ADMIN — LORATADINE 10 MG: 10 TABLET ORAL at 07:55

## 2020-07-17 RX ADMIN — KETOROLAC TROMETHAMINE 30 MG: 30 INJECTION, SOLUTION INTRAMUSCULAR at 09:35

## 2020-07-17 RX ADMIN — KETOROLAC TROMETHAMINE 30 MG: 30 INJECTION, SOLUTION INTRAMUSCULAR at 04:14

## 2020-07-17 RX ADMIN — Medication 10 ML: at 09:38

## 2020-07-17 RX ADMIN — ACETAMINOPHEN 1000 MG: 500 TABLET, FILM COATED ORAL at 06:03

## 2020-07-17 RX ADMIN — DOCUSATE SODIUM 100 MG: 100 CAPSULE, LIQUID FILLED ORAL at 07:53

## 2020-07-17 RX ADMIN — OXYCODONE 10 MG: 5 TABLET ORAL at 15:36

## 2020-07-17 RX ADMIN — Medication 10 ML: at 13:04

## 2020-07-17 RX ADMIN — GABAPENTIN 300 MG: 300 CAPSULE ORAL at 07:54

## 2020-07-17 RX ADMIN — GABAPENTIN 300 MG: 300 CAPSULE ORAL at 15:35

## 2020-07-17 NOTE — PROGRESS NOTES
Discharge    Patient afebrile and VSS. Denies any questions after review of AVS.  Given pain medication for the ride home. IV site removed.  to drive patient home.  Medications sent to the pharmacy by MD.

## 2020-07-17 NOTE — PROGRESS NOTES
Problem: Falls - Risk of  Goal: *Absence of Falls  Description: Document [place] Fall Risk and appropriate interventions in the flowsheet.   Outcome: Progressing Towards Goal  Note: Fall Risk Interventions:            Medication Interventions: Teach patient to arise slowly                   Problem: Patient Education: Go to Patient Education Activity  Goal: Patient/Family Education  Outcome: Progressing Towards Goal

## 2020-07-17 NOTE — INTERVAL H&P NOTE
Update History & Physical    The Patient's History and Physical of June 24, 2020 was reviewed with the patient and I examined the patient. There was no change. The surgical site was confirmed by the patient and me. Her surgery has been rescheduled multiple times (see notes). Her 7/8/2020 surgery date was cancelled by the hospital b/c her covid screening test had not resulted. She was re-scheduled for today, her covid screening test was negative (per staff a documented hard copy of the result was confirmed negative). Hospital policy changed 6/0/4183 and currently a neg covid screening is no longer required pre- op but apparently that policy will revert back to requiring a negative covid screen pre-op in the next few weeks. Plan:  The risk, benefits, expected outcome, and alternative to the recommended procedure have been discussed with the patient. Patient understands and wants to proceed with the procedure.     Electronically signed by Franko Esteban MD on 7/16/2020 at 9:38 PM

## 2020-07-17 NOTE — DISCHARGE SUMMARY
Discharge Summary     Name: Yarely Samson MRN: [id number]  SSN: [id number]    YOB: 1975  Age: 40 y.o. Sex: female      Allergies: Patient has no known allergies. Admit Date: 7/16/2020    Discharge Date: 7/17/2020      Admitting Physician: Juan A Ortiz MD     * Admission Diagnoses: Dysmenorrhea, Dyspareunia, Metrorrhagia and Pelvic pain    * Discharge Diagnoses:   Hospital Problems as of 7/17/2020 Date Reviewed: 7/16/2020          Codes Class Noted - Resolved POA    Dysmenorrhea ICD-10-CM: N94.6  ICD-9-CM: 625.3  7/16/2020 - Present Unknown        IMB (intermenstrual bleeding) ICD-10-CM: N92.3  ICD-9-CM: 626.6  7/16/2020 - Present Unknown        * (Principal) Pelvic pain ICD-10-CM: R10.2  ICD-9-CM: [id number]  7/16/2020 - Present Unknown        Dyspareunia in female ICD-10-CM: N94.10  ICD-9-CM: 625.0  7/16/2020 - Present Unknown               * Procedures: Total Laparoscopic Hysterectomy with Bilateral Salpingectomy    * Discharge Condition: [facility] Hospital Course: Normal hospital course for this procedure.     Significant Diagnostic Studies:   Recent Results (from the past 24 hour(s))   GLUCOSE, POC    Collection Time: 07/16/20  3:44 PM   Result Value Ref Range    Glucose (POC) 172 (H) 65 - 100 mg/dL   GLUCOSE, POC    Collection Time: 07/16/20  8:57 PM   Result Value Ref Range    Glucose (POC) 181 (H) 65 - 100 mg/dL   HEMOGLOBIN    Collection Time: 07/17/20  4:35 AM   Result Value Ref Range    HGB 11.9 11.7 - 15.4 g/dL   CREATININE    Collection Time: 07/17/20  4:35 AM   Result Value Ref Range    Creatinine 0.67 0.6 - 1.0 MG/DL   GLUCOSE, POC    Collection Time: 07/17/20  6:05 AM   Result Value Ref Range    Glucose (POC) 101 (H) 65 - 100 mg/dL   GLUCOSE, POC    Collection Time: 07/17/20 11:17 AM   Result Value Ref Range    Glucose (POC) 103 (H) 65 - 100 mg/dL       * Disposition: Home    Discharge Medications:   Current Discharge Medication List      START taking these medications    Details oxyCODONE IR (ROXICODONE) 5 mg immediate release tablet Take 1 Tab by mouth every four (4) hours as needed for Pain for up to 7 days. Max Daily Amount: 30 mg.  Qty: 20 Tab, Refills: 0    Associated Diagnoses: Post-op pain      ibuprofen (MOTRIN) 800 mg tablet Take 1 Tab by mouth every eight (8) hours. Indications: pain  Qty: 30 Tab, Refills: 0         CONTINUE these medications which have NOT CHANGED    Details   metFORMIN (GLUCOPHAGE) 500 mg tablet Take 1 Tab by mouth daily (with dinner). Qty: 90 Tab, Refills: 3    Associated Diagnoses: Type 2 diabetes mellitus without complication, without long-term current use of insulin (HCC)      Rybelsus 7 mg tablet Take 1 Tab by mouth Daily (before breakfast). Qty: 30 Tab, Refills: 11    Associated Diagnoses: Type 2 diabetes mellitus without complication, without long-term current use of insulin (HCC)      loratadine (CLARITIN) 10 mg tablet Take 10 mg by mouth daily. SUMAtriptan (IMITREX) 50 mg tablet Take 1 tablet at the first sign of a migraine, may repeat in 2 hours if symptoms do not improve. Do not exceed 200mg in a 24 hour period. * Follow-up Care/Patient Instructions: Activity: No sex, douching, or tampons for 6 weeks or as directed by your physician. No heavy lifting for 6 weeks. No driving while taking pain medication.   Diet: Resume pre-hospital diet  Wound Care: As directed    Follow-up Information     Follow up With Specialties Details Why Contact Info    Harry Becker NP Nurse Practitioner

## 2020-07-17 NOTE — PROGRESS NOTES
Shift assessment completed as noted. Encouraged patient to call out with any needs. Patient voiced understanding.

## 2020-07-17 NOTE — PROGRESS NOTES
Gynecology Progress Note    Patient doing well post-op day 1 from Procedure(s): HYSTERECTOMY TOTAL LAPAROSCOPIC/ BILATERAL SALPINGECTOMY without significant complaints. Pain controlled on current medication. Voiding without difficulty. Patient is passing flatus. Patient Vitals for the past 24 hrs:   Temp Pulse Resp BP SpO2   07/17/20 1040 98.2 °F (36.8 °C) 100 18 122/82 96 %   07/17/20 0713 98.2 °F (36.8 °C) 78 18 105/72 96 %   07/17/20 0335 98.5 °F (36.9 °C) 91 17 111/76 95 %   07/16/20 2305 98 °F (36.7 °C) 81 18 100/67 95 %   07/16/20 1920 98.4 °F (36.9 °C) 93 18 108/74 94 %   07/16/20 1549 98.3 °F (36.8 °C) 78 17 125/82 97 %       Exam:  Patient without distress. Abdomen soft,  nontender. Incision dry and clean without erythema. Lower extremities are negative for swelling, cords, or tenderness. Lab/Data Review: All lab results for the last 24 hours reviewed. Assessment and Plan:  Patient appears to be having uncomplicated post Procedure(s): HYSTERECTOMY TOTAL LAPAROSCOPIC/ BILATERAL SALPINGECTOMY course. 2.  BSs labile, d/w pt. Resume diabetic meds/diet, follow up  With TIFFANY Valentin as scheduled. D/w pt:  Infx/driving/physical activity/pelvic rest precautions    Pelvic rest x 6 wk ( no sex, swimming, tampons or douching)  Pt may drive after 2-4 weeks as long as she is NOT taking narcotics & can quickly maneuver her foot from the gas to the brake. For the next 2-4 weeks:  eat, shower and advance activity as tolerated. Notify [facility] for temp > 100.5, vaginal discharge with odor, heavy VB, worsening pelvic/abdominal pain and/or other concerns.

## 2020-07-18 ENCOUNTER — PATIENT OUTREACH (OUTPATIENT)
Dept: CASE MANAGEMENT | Age: 45
End: 2020-07-18

## 2020-07-18 NOTE — PROGRESS NOTES
Patient contacted regarding recent discharge and COVID-19 risk. Discussed COVID-19 related testing which was not done at this time. Test results were not done. Patient informed of results, if available? no    Care Transition Nurse/ Ambulatory Care Manager contacted the patient by telephone to perform post discharge assessment. Verified name and  with patient as identifiers. Patient has following risk factors of: Pelvic Pain. CC reviewed discharge instructions, medical action plan and red flags related to discharge diagnosis. Reviewed and educated them on any new and changed medications related to discharge diagnosis. Advised obtaining a 90-day supply of all daily and as-needed medications. Education provided regarding infection prevention, and signs and symptoms of COVID-19 and when to seek medical attention with patient who verbalized understanding. Discussed exposure protocols and quarantine from [address] you at higher risk for severe illness  and given an opportunity for questions and concerns. The patient agrees to contact the COVID-19 hotline [phone] or PCP office for questions related to their healthcare. CTN/ACM provided contact information for future reference. From CDC: Are you at higher risk for severe illness?  Wash your hands often.  Avoid close contact (6 feet, which is about two arm lengths) with people who are sick.  Put distance between yourself and other people if COVID-19 is spreading in your community.  Clean and disinfect frequently touched surfaces.  Avoid all cruise travel and non-essential air travel.  Call your healthcare professional if you have concerns about COVID-19 and your underlying condition or if you are sick.     For more information on steps you can take to protect yourself, see CDC's How to Protect Yourself      Patient/family/caregiver given information for [employer] and agrees to enroll no  Patient's preferred e-mail: Patient's preferred phone number:   Based on Loop alert triggers, patient will be contacted by nurse care manager for worsening symptoms. Patient will have follow-up within 7 to 14 days.

## 2020-07-22 NOTE — ANESTHESIA POSTPROCEDURE EVALUATION
Procedure(s): HYSTERECTOMY TOTAL LAPAROSCOPIC/ BILATERAL SALPINGECTOMY.     general    Anesthesia Post Evaluation      Multimodal analgesia: multimodal analgesia used between 6 hours prior to anesthesia start to PACU discharge  Patient location during evaluation: PACU  Patient participation: complete - patient participated  Level of consciousness: awake  Pain management: adequate  Airway patency: patent  Anesthetic complications: no  Cardiovascular status: acceptable  Respiratory status: acceptable  Hydration status: acceptable  Post anesthesia nausea and vomiting:  none  Final Post Anesthesia Temperature Assessment:  Normothermia (36.0-37.5 degrees C)      INITIAL Post-op Vital signs:   Vitals Value Taken Time   /65 7/16/2020 10:35 AM   Temp 36.7 °C (98 °F) 7/16/2020 10:05 AM   Pulse 79 7/16/2020 10:35 AM   Resp 14 7/16/2020 10:35 AM   SpO2 100 % 7/16/2020 10:35 AM

## 2020-07-29 ENCOUNTER — PATIENT OUTREACH (OUTPATIENT)
Dept: CASE MANAGEMENT | Age: 45
End: 2020-07-29

## 2020-12-28 PROBLEM — R03.0 ELEVATED BLOOD PRESSURE READING: Status: ACTIVE | Noted: 2020-12-28

## 2021-07-16 ENCOUNTER — HOSPITAL ENCOUNTER (OUTPATIENT)
Dept: MAMMOGRAPHY | Age: 46
Discharge: HOME | End: 2021-07-16
Attending: OBSTETRICS & GYNECOLOGY
Payer: COMMERCIAL

## 2021-07-16 DIAGNOSIS — Z12.31 ENCOUNTER FOR SCREENING MAMMOGRAM FOR BREAST CANCER: ICD-10-CM

## 2021-07-16 PROCEDURE — 77063 BREAST TOMOSYNTHESIS BI: CPT

## 2022-03-19 PROBLEM — R10.2 PELVIC PAIN: Status: ACTIVE | Noted: 2020-07-16

## 2022-03-19 PROBLEM — N94.10 DYSPAREUNIA IN FEMALE: Status: ACTIVE | Noted: 2020-07-16

## 2022-03-19 PROBLEM — R03.0 ELEVATED BLOOD PRESSURE READING: Status: ACTIVE | Noted: 2020-12-28

## 2022-03-19 PROBLEM — N92.3 IMB (INTERMENSTRUAL BLEEDING): Status: ACTIVE | Noted: 2020-07-16

## 2022-03-20 PROBLEM — N94.6 DYSMENORRHEA: Status: ACTIVE | Noted: 2020-07-16

## 2022-03-20 PROBLEM — E11.9 DIABETES (HCC): Status: ACTIVE | Noted: 2020-02-17

## 2022-05-09 ENCOUNTER — HOSPITAL ENCOUNTER (OUTPATIENT)
Dept: MAMMOGRAPHY | Age: 47
Discharge: HOME | End: 2022-05-09
Attending: OBSTETRICS & GYNECOLOGY
Payer: COMMERCIAL

## 2022-05-09 DIAGNOSIS — N63.20 MASS OF LEFT BREAST, UNSPECIFIED QUADRANT: ICD-10-CM

## 2022-05-09 PROCEDURE — 77062 BREAST TOMOSYNTHESIS BI: CPT

## 2022-05-09 PROCEDURE — 76642 ULTRASOUND BREAST LIMITED: CPT

## 2022-05-10 NOTE — PROGRESS NOTES
Notify patient MMG and breast US appear benign. FU left breast US is advised in 6m, please schedule. Also have her come in for a breast recheck in 3m, please schedule. Remind Claudia to keep an eye on her SBE, let us know if anything changes or she has concerns,     Thanks.

## 2022-08-18 ENCOUNTER — OFFICE VISIT (OUTPATIENT)
Dept: OBGYN CLINIC | Age: 47
End: 2022-08-18
Payer: COMMERCIAL

## 2022-08-18 VITALS
DIASTOLIC BLOOD PRESSURE: 76 MMHG | HEIGHT: 63 IN | BODY MASS INDEX: 32.6 KG/M2 | WEIGHT: 184 LBS | SYSTOLIC BLOOD PRESSURE: 122 MMHG

## 2022-08-18 DIAGNOSIS — N60.02 BILATERAL BREAST CYSTS: Primary | ICD-10-CM

## 2022-08-18 DIAGNOSIS — B35.4 TINEA CORPORIS: ICD-10-CM

## 2022-08-18 DIAGNOSIS — N60.01 BILATERAL BREAST CYSTS: Primary | ICD-10-CM

## 2022-08-18 DIAGNOSIS — N60.11 BILATERAL FIBROCYSTIC BREAST CHANGES: ICD-10-CM

## 2022-08-18 DIAGNOSIS — N60.12 BILATERAL FIBROCYSTIC BREAST CHANGES: ICD-10-CM

## 2022-08-18 PROCEDURE — 99213 OFFICE O/P EST LOW 20 MIN: CPT | Performed by: OBSTETRICS & GYNECOLOGY

## 2022-08-18 RX ORDER — NYSTATIN 100000 [USP'U]/G
POWDER TOPICAL
Qty: 1 EACH | Refills: 1 | Status: SHIPPED | OUTPATIENT
Start: 2022-08-18

## 2022-08-18 NOTE — PROGRESS NOTES
Aga Jean  55 y.o.  1975  [id number]    Today:  8/18/2022    FU left breast lump      No nipple drainage. Today:  5/2/2022     c/o a left breast marble like knot. She denies any trauma, redness, drainage, dimpling, pain, or excessive caffeine or chocolate. She's noticed the knot on 4/29/22. Patient presents today for evaluation of a left breast lump found a few days ago. Denies nipple drainage. Is not breastfeeding  Does not recall any trauma to the breast.      Last MMG 7/2021--> normal  FH PA:  Breast cancer. Last pap smear taken on 6/2021--> normal     Bilateral Fibrocystic change:   marked   Left breast 11:00 ~ 10 cm from the areolar edge a tiny superficial nodule ~ 3 mm max diameter is palpable, feels like a small sebaceous cyst.  Left breast, 7:00 at the periphery---> thickened ridge of solid tissue ~ 1.5 cm long. Right breast 10:00 subtle near the periphery, likely small cyst.      A/P:  1. Breast nodules-->?  diagnostic MMG and bilateral breast US:        2. AE due after 6/24/2022. 5/9/2022: Bilateral MMG and left breast US:   MMG:  small focal asymmetry superficially at the site of left posterior 11:00 palpable concern   US eval of the area of palpable concern, 11:00, 9 cm from the nipple -->very superficial elongated hyperechoic mass with an adjacent tiny hypoechoic nodule measuring approximately 10 x 3 x 7 mm. Likely benign, possibly a focus of fat necrosis or a lipoma within the adjacent microcyst.    In the area of peripheral 7:00 palpable concern reported by Dr. Joey Jeans, no discrete cystic or solid mass is identified. Impression:  PROBABLY BENIGN, SUPERFICIAL 1 CM LEFT POSTERIOR 11:00 MASS WITH NO DEFINITE EVIDENCE OF MALIGNANCY ELSEWHERE IN EITHER BREAST. FU Left  BREAST US IS RECOMMENDED IN 6 MONTHS, UNLESS THE PALPABLE LUMP RESOLVED IN THE INTERIM OR FURTHER EVALUATION IS CLINICALLY INDICATED SOONER (SEE ABOVE). No LMP recorded.  Patient has had a hysterectomy. No Known Allergies    Current Outpatient Medications   Medication Sig    atorvastatin (LIPITOR) 10 MG tablet Take 10 mg by mouth daily    ibuprofen (ADVIL;MOTRIN) 800 MG tablet Take 800 mg by mouth every 8 hours    loratadine (CLARITIN) 10 MG tablet Take 10 mg by mouth daily    metFORMIN (GLUCOPHAGE) 500 MG tablet Take 500 mg by mouth Daily with supper    Semaglutide (RYBELSUS) 7 MG TABS Take 7 mg by mouth every morning (before breakfast)    SUMAtriptan (IMITREX) 50 MG tablet Take 1 tablet at the first sign of a migraine, may repeat in 2 hours if symptoms do not improve. Do not exceed 200mg in a 24 hour period. (Patient not taking: Reported on 2022)     No current facility-administered medications for this visit. Past Medical History:   Diagnosis Date    Abnormal Pap smear of cervix     LEEP    Diabetes ([facility] [address]     Type 2, Metformin and rybelsus, Average fasting- 100, Hypoglycemia signs at 70, Last A1c 5.6 2020    Headache(784.0)        Past Surgical History:   Procedure Laterality Date    GYN          GYN       X1    HEENT  's    tonsilectomy    HYSTERECTOMY (CERVIX STATUS UNKNOWN)  2020    WISDOM TOOTH EXTRACTION         OB History    Para Term  AB Living   1     1   2   SAB IAB Ectopic Molar Multiple Live Births                     Social History     Social History Narrative    1.   PCP:  Pati Avendaño ([facility])       Family History   Problem Relation Age of Onset    Prostate Cancer Neg Hx     Pulmonary Embolism Neg Hx     Colon Cancer Neg Hx     Premature Birth Son     Diabetes Paternal Uncle     Cancer Paternal Grandfather         brain tumor, [employer]    Heart Disease Paternal Grandfather     Alzheimer's Disease Paternal Grandmother     Elevated Lipids Brother     Obesity Brother     Hypertension Brother     Breast Cancer Paternal Aunt         teenage diagnosis, ????    Elevated Lipids Father     Dementia Father         christiano body dementia    Parkinsonism Father     Hypertension Maternal Grandfather     Heart Disease Maternal Grandfather     Heart Disease Maternal Grandmother     Other Mother         thyroid nodule    Heart Disease Mother     Deep Vein Thrombosis Neg Hx     Ovarian Cancer Neg Hx          Physical Exam:  /76   Ht 5' 3\" (1.6 m)   Wt 184 lb (83.5 kg)   BMI 32.59 kg/m²     Ela Shanks is a 55 y.o. Pasquale Toro female who appears pleasant, well developed, well nourished, with good attention to hygiene and body habitus. Oriented to person, place and time. Mood and affect are normal and appropriate to the situation. Ela Shanks is in no apparent distress,    Head is normocephalic, atraumatic, without any gross head or neck masses. Neck: Neck exam reveals no abnormalities. Neck lymph nodes are normal.   No supraclavicular lymphadenopathy noted. No axillary lymphadenopathy noted. Breast: Breasts are bilaterally symmetric, no dimpling, retractions, adenopathy or dominant masses. No nipple drainage elicited. Bilateral inframammary  erythema c/w tinea corporis  Breasts show no palpable masses or tenderness. Bilateral Fibrocystic change:   marked    Left breast 11:00  periareolar ~ 3-5 mm elongated  Left breast 11:00  near the periphery, very superficial mobile ~ 3 mm  Right breast 8:00 ~ 6 cm from areolar edge, ? cyst vs prominent FC change. A/P:  1. Breast problems, 56 yo, s/p hysterectomy:    FU left breast lump, 5/2022 Bilateral diagnostic MMG and breast US reassuring. Repeat exam today stable w/ 5/2022   breast cancer:  Paternal Aunt  Plan to repeat in ~ 6 m scheduled 11/17/2022  Pt reassured, exam is not worrisome, continue monthly SBE.    2.  Inframammary tinea corporis:   Terazol qhs and Nystatin powder bd-qid affected area    3. Fu for AE, last pap 6/2021     Diagnosis Orders   1. Bilateral breast cysts        2. Bilateral fibrocystic breast changes        3.  Tinea corporis  terconazole (TERAZOL 7) 0.4 % vaginal cream    nystatin (MYCOSTATIN) [id number] UNIT/GM powder            Dictated using voice recognition software.   Proofread but unrecognized errors may exist.    Breann Laird MD, Dmitry Cook

## 2022-11-17 ENCOUNTER — HOSPITAL ENCOUNTER (OUTPATIENT)
Dept: MAMMOGRAPHY | Age: 47
Discharge: HOME | End: 2022-11-20
Payer: COMMERCIAL

## 2022-11-17 DIAGNOSIS — N63.20 MASS OF LEFT BREAST: ICD-10-CM

## 2022-11-17 PROCEDURE — 76642 ULTRASOUND BREAST LIMITED: CPT

## 2022-11-21 ENCOUNTER — OFFICE VISIT (OUTPATIENT)
Dept: ENDOCRINOLOGY | Age: 47
End: 2022-11-21
Payer: COMMERCIAL

## 2022-11-21 VITALS
BODY MASS INDEX: 32.43 KG/M2 | OXYGEN SATURATION: 99 % | HEART RATE: 82 BPM | RESPIRATION RATE: 16 BRPM | DIASTOLIC BLOOD PRESSURE: 78 MMHG | WEIGHT: 183 LBS | TEMPERATURE: 97.7 F | HEIGHT: 63 IN | SYSTOLIC BLOOD PRESSURE: 120 MMHG

## 2022-11-21 DIAGNOSIS — Z79.4 TYPE 2 DIABETES MELLITUS WITHOUT COMPLICATION, WITH LONG-TERM CURRENT USE OF INSULIN (HCC): Primary | ICD-10-CM

## 2022-11-21 DIAGNOSIS — E78.2 MIXED HYPERLIPIDEMIA: ICD-10-CM

## 2022-11-21 DIAGNOSIS — E11.9 TYPE 2 DIABETES MELLITUS WITHOUT COMPLICATION, WITH LONG-TERM CURRENT USE OF INSULIN (HCC): Primary | ICD-10-CM

## 2022-11-21 LAB — HBA1C MFR BLD: 5.7 %

## 2022-11-21 PROCEDURE — 3044F HG A1C LEVEL LT 7.0%: CPT | Performed by: PHYSICIAN ASSISTANT

## 2022-11-21 PROCEDURE — 83036 HEMOGLOBIN GLYCOSYLATED A1C: CPT | Performed by: PHYSICIAN ASSISTANT

## 2022-11-21 PROCEDURE — 99214 OFFICE O/P EST MOD 30 MIN: CPT | Performed by: PHYSICIAN ASSISTANT

## 2022-11-21 RX ORDER — ORAL SEMAGLUTIDE 14 MG/1
14 TABLET ORAL DAILY
Qty: 90 TABLET | Refills: 3 | Status: SHIPPED | OUTPATIENT
Start: 2022-11-21

## 2022-11-21 RX ORDER — ORAL SEMAGLUTIDE 14 MG/1
14 TABLET ORAL DAILY
COMMUNITY
End: 2022-11-21 | Stop reason: SDUPTHER

## 2022-11-21 RX ORDER — ATORVASTATIN CALCIUM 10 MG/1
10 TABLET, FILM COATED ORAL DAILY
Qty: 90 TABLET | Refills: 3 | Status: SHIPPED | OUTPATIENT
Start: 2022-11-21
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
## 2023-02-23 ENCOUNTER — OFFICE VISIT (OUTPATIENT)
Dept: OBGYN CLINIC | Age: 48
End: 2023-02-23
Payer: COMMERCIAL

## 2023-02-23 VITALS
WEIGHT: 182 LBS | DIASTOLIC BLOOD PRESSURE: 90 MMHG | HEIGHT: 63 IN | SYSTOLIC BLOOD PRESSURE: 138 MMHG | BODY MASS INDEX: 32.25 KG/M2

## 2023-02-23 DIAGNOSIS — Z12.31 ENCOUNTER FOR SCREENING MAMMOGRAM FOR MALIGNANT NEOPLASM OF BREAST: ICD-10-CM

## 2023-02-23 DIAGNOSIS — Z01.419 WELL WOMAN EXAM WITH ROUTINE GYNECOLOGICAL EXAM: Primary | ICD-10-CM

## 2023-02-23 DIAGNOSIS — R03.0 ELEVATED BP WITHOUT DIAGNOSIS OF HYPERTENSION: ICD-10-CM

## 2023-02-23 PROCEDURE — 99396 PREV VISIT EST AGE 40-64: CPT | Performed by: OBSTETRICS & GYNECOLOGY

## 2023-02-23 SDOH — ECONOMIC STABILITY: INCOME INSECURITY: HOW HARD IS IT FOR YOU TO PAY FOR THE VERY BASICS LIKE FOOD, HOUSING, MEDICAL CARE, AND HEATING?: NOT HARD AT ALL

## 2023-02-23 SDOH — ECONOMIC STABILITY: FOOD INSECURITY: WITHIN THE PAST 12 MONTHS, THE FOOD YOU BOUGHT JUST DIDN'T LAST AND YOU DIDN'T HAVE MONEY TO GET MORE.: NEVER TRUE

## 2023-02-23 SDOH — ECONOMIC STABILITY: HOUSING INSECURITY
IN THE LAST 12 MONTHS, WAS THERE A TIME WHEN YOU DID NOT HAVE A STEADY PLACE TO SLEEP OR SLEPT IN A SHELTER (INCLUDING NOW)?: NO

## 2023-02-23 SDOH — ECONOMIC STABILITY: FOOD INSECURITY: WITHIN THE PAST 12 MONTHS, YOU WORRIED THAT YOUR FOOD WOULD RUN OUT BEFORE YOU GOT MONEY TO BUY MORE.: NEVER TRUE

## 2023-02-23 ASSESSMENT — PATIENT HEALTH QUESTIONNAIRE - PHQ9
SUM OF ALL RESPONSES TO PHQ9 QUESTIONS 1 & 2: 0
SUM OF ALL RESPONSES TO PHQ QUESTIONS 1-9: 0
2. FEELING DOWN, DEPRESSED OR HOPELESS: 0
SUM OF ALL RESPONSES TO PHQ QUESTIONS 1-9: 0
1. LITTLE INTEREST OR PLEASURE IN DOING THINGS: 0

## 2023-02-23 NOTE — PROGRESS NOTES
Annual Exam  Established ptTomas Rene   52 y.o.  1975  [id number]    Today:  23     *    Presents for well woman annual exam.  Reports:  Doing well, denies dyspareunia. BC:   status post hysterectomy.     Past Medical History:   Diagnosis Date    Abnormal Pap smear of cervix     LEEP    Diabetes ([facility] [address]     Type 2, Metformin and rybelsus, Average fasting- 100, Hypoglycemia signs at 70, Last A1c 5.6 2020    Headache(784.0)     Pap smear for cervical cancer screening     normal paps:  , , 2016, 2017, 2018, 2019,        Past Surgical History:   Procedure Laterality Date    GYN          GYN       X1    HEENT      tonsilectomy    HYSTERECTOMY (CERVIX STATUS UNKNOWN)  2020    WISDOM TOOTH EXTRACTION  2004       Family History   Problem Relation Age of Onset    Prostate Cancer Neg Hx     Pulmonary Embolism Neg Hx     Colon Cancer Neg Hx     Premature Birth Son     Diabetes Paternal Uncle     Cancer Paternal Grandfather         brain tumor, [employer]    Heart Disease Paternal Grandfather     Alzheimer's Disease Paternal Grandmother     Elevated Lipids Brother     Obesity Brother     Hypertension Brother     Breast Cancer Paternal Aunt         teenage diagnosis, ????    Elevated Lipids Father     Dementia Father         christiano body dementia    Parkinsonism Father     Hypertension Maternal Grandfather     Heart Disease Maternal Grandfather     Heart Disease Maternal Grandmother     Other Mother         thyroid nodule    Heart Disease Mother     Deep Vein Thrombosis Neg Hx     Ovarian Cancer Neg Hx        OB History    Para Term  AB Living   1 1 0 1 0 1   SAB IAB Ectopic Molar Multiple Live Births   0 0 0 0 0 1      # Outcome Date GA Lbr Chris/2nd Weight Sex Delivery Anes PTL Lv   1  07 29w0d  2 lb 13 oz (1.276 kg) M CS-Unspec  Y OPHELIA      Complications: Abruptio Placenta      Name: Quinton Burnett       Social History     Socioeconomic History    Marital status:      Spouse name: None    Number of children: None    Years of education: None    Highest education level: None   Tobacco Use    Smoking status: Never    Smokeless tobacco: Never   Substance and Sexual Activity    Alcohol use: No    Drug use: No    Sexual activity: Yes     Partners: Male     Birth control/protection: Pill     Comment: Hysterectomy   Social History Narrative    1. PCP:  Davis Beckford ([facility])     Social Determinants of Health     Financial Resource Strain: Low Risk     Difficulty of Paying Living Expenses: Not hard at all   Food Insecurity: No Food Insecurity    Worried About [address] in the Last Year: Never true    [address]  [employer] in the Last Year: Never true   Transportation Needs: Unknown    Lack of Transportation (Non-Medical): No   Housing Stability: Unknown    Unstable Housing in the Last Year: No       Current Outpatient Medications   Medication Sig    RYBELSUS 14 MG TABS Take 14 mg by mouth daily    metFORMIN (GLUCOPHAGE) 500 MG tablet Take 1 tablet by mouth Daily with supper    atorvastatin (LIPITOR) 10 MG tablet Take 1 tablet by mouth daily    loratadine (CLARITIN) 10 MG tablet Take 10 mg by mouth daily     No current facility-administered medications for this visit. Review of Systems    Updated from patient's \"Review of Systems\" form that patient completed. Physical Exam:  BP (!) 138/90   Ht 5' 3\" (1.6 m)   Wt 182 lb (82.6 kg)   BMI 32.24 kg/m² , No LMP recorded. Patient has had a hysterectomy. Patient is a 52 y.o. female who appears pleasant, in no apparent distress, her given age, well developed, well nourished and with good attention to hygiene and body habitus. Oriented to person, place and time. Mood and affect normal and appropriate to situation. Head is normocephalic, atraumatic, without any gross head or neck masses. Neck: Neck exam reveals no abnormalities.    Thyroid ~ 3 cm/symmetric, no abnormalities. Lymph nodes:   Neck lymph nodes are normal.   No supraclavicular lymphadenopathy noted. No axillary lymphadenopathy noted. No groin lymphadenopathy noted. Cardiovascular: Heart auscultation reveals no murmurs, gallop, rubs or clicks. Skin: No skin rash, abnormal appearing nevi, subcutaneous nodules, lesions or ulcers observed. Abdomen: Abdomen soft, non tender, without palpable masses. Palpation of liver reveals no abnormalities with respect to size, tenderness or masses. Palpation of spleen reveals no abnormalities with respect to size, tenderness or masses. Breast: Symmetrical, no: dimpling, retractions, adenopathy, dominant masses or nipple discharge elicited. Breasts show no palpable masses or tenderness. Bilateral Fibrocystic change is:  marked     No changes suspicious for malignancy      Genitourinary:  External genitalia are normal in appearance. Diffuse atrophic changes consistent with age and hormonal status noted throughout her SSE. Examination of urethral meatus reveals location normal and size normal.   Examination of urethra shows no abnormalities. Examination of vaginal vault reveals no abnormalities. Pale with decreased rugations. Cervix & Uterus surgically absent  Adnexa and parametria show no masses, tenderness, organomegaly or nodularity. Suboptimal bimanual exam secondary to BMI     Examination of anus and perineum shows no abnormalities. Rectal exam reveals normal sphincter tone without presence of hemorrhoids or masses. ASSESSMENT and PLAN    1.  AE. Importance of self breast exam reviewed, pt educated how to perform SBE.      2.  Her PCP:  NUNO Howard NP     3. Has not had the Covid vacc  Flu vacc is not current  Tdap booster is due.     Immunization History   Administered Date(s) Administered    DTaP (Infanrix) 07/23/2012    Influenza Virus Vaccine 09/13/2012, 01/11/2018    Influenza, FLUCELVAX, (age 10 mo+), MDCK, PF, 0.5mL 10/30/2018, 10/03/2020    Influenza, Triv, 3 Years and older, IM (Afluria (5 yrs and older) 10/21/2010, 10/15/2013    Pneumococcal Polysaccharide ([id number]) 01/17/2019    Tdap (Boostrix, Adacel) 07/23/2012     4. H/o LEEP in her 25s. Current recommendation for pap smears after hysterectomy d/w Emily Sandoval. She declines pap today. normal paps:  2014, 2015, 2016, 2017, 2018, 2019, 7/2020 [facility] w/ BS- nl path,  2021    5. Yearly dermatologic skin check advised. Patient will schedule at her convenience. No worrisome nevi noted on exam.  A list of local dermatologists was given to [employer]. 6.   MMG is:  due 5/10/2023     7. BP is mildly elevated, watch/if persistent d/w PCP     Re ROS--> non gynecologic concerns referred back to her PCP or appropriate specialist.      Wt Readings from Last 3 Encounters: Wt Readings from Last 3 Encounters:   02/23/23 182 lb (82.6 kg)   11/21/22 183 lb (83 kg)   08/18/22 184 lb (83.5 kg)       BP Readings from Last 3 Encounters:  BP Readings from Last 3 Encounters:   02/23/23 (!) 138/90   11/21/22 120/78   08/18/22 122/76        Diagnosis Orders   1. Well woman exam with routine gynecological exam  ONELIA LARISSA DIGITAL SCREEN BILATERAL      2. Encounter for screening mammogram for malignant neoplasm of breast  ONELIA LARISSA DIGITAL SCREEN BILATERAL      3. Elevated BP without diagnosis of hypertension            Orders Placed This Encounter   Procedures    ONELIA LARISSA DIGITAL SCREEN BILATERAL       Dictated using voice recognition software.   Proofread but unrecognized errors may exist.    Signed by:  Castillo Waters MD, Zenon Rockwell

## 2023-05-12 ENCOUNTER — HOSPITAL ENCOUNTER (OUTPATIENT)
Dept: MAMMOGRAPHY | Age: 48
Discharge: HOME | End: 2023-05-12
Payer: COMMERCIAL

## 2023-05-12 DIAGNOSIS — Z01.419 WELL WOMAN EXAM WITH ROUTINE GYNECOLOGICAL EXAM: ICD-10-CM

## 2023-05-12 DIAGNOSIS — Z12.31 ENCOUNTER FOR SCREENING MAMMOGRAM FOR MALIGNANT NEOPLASM OF BREAST: ICD-10-CM

## 2023-05-12 PROCEDURE — 77063 BREAST TOMOSYNTHESIS BI: CPT

## 2023-11-08 ENCOUNTER — OFFICE VISIT (OUTPATIENT)
Dept: ENDOCRINOLOGY | Age: 48
End: 2023-11-08

## 2023-11-08 VITALS
OXYGEN SATURATION: 99 % | BODY MASS INDEX: 32.25 KG/M2 | HEART RATE: 76 BPM | SYSTOLIC BLOOD PRESSURE: 126 MMHG | WEIGHT: 182 LBS | HEIGHT: 63 IN | DIASTOLIC BLOOD PRESSURE: 80 MMHG

## 2023-11-08 DIAGNOSIS — E78.2 MIXED HYPERLIPIDEMIA: ICD-10-CM

## 2023-11-08 DIAGNOSIS — E11.9 TYPE 2 DIABETES MELLITUS WITHOUT COMPLICATION, WITH LONG-TERM CURRENT USE OF INSULIN (HCC): Primary | ICD-10-CM

## 2023-11-08 DIAGNOSIS — Z79.4 TYPE 2 DIABETES MELLITUS WITHOUT COMPLICATION, WITH LONG-TERM CURRENT USE OF INSULIN (HCC): Primary | ICD-10-CM

## 2023-11-08 LAB — HBA1C MFR BLD: 6 %

## 2023-11-08 RX ORDER — ORAL SEMAGLUTIDE 14 MG/1
14 TABLET ORAL DAILY
Qty: 30 TABLET | Refills: 11 | Status: SHIPPED | OUTPATIENT
Start: 2023-11-08

## 2023-11-08 RX ORDER — ATORVASTATIN CALCIUM 10 MG/1
10 TABLET, FILM COATED ORAL DAILY
Qty: 30 TABLET | Refills: 11 | Status: SHIPPED | OUTPATIENT
Start: 2023-11-08
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
## 2024-02-29 ENCOUNTER — OFFICE VISIT (OUTPATIENT)
Dept: OBGYN CLINIC | Age: 49
End: 2024-02-29
Payer: COMMERCIAL

## 2024-02-29 VITALS
BODY MASS INDEX: 32.43 KG/M2 | WEIGHT: 183 LBS | SYSTOLIC BLOOD PRESSURE: 140 MMHG | DIASTOLIC BLOOD PRESSURE: 82 MMHG | HEIGHT: 63 IN

## 2024-02-29 DIAGNOSIS — Z12.11 SCREENING FOR COLON CANCER: ICD-10-CM

## 2024-02-29 DIAGNOSIS — Z12.31 ENCOUNTER FOR SCREENING MAMMOGRAM FOR MALIGNANT NEOPLASM OF BREAST: ICD-10-CM

## 2024-02-29 DIAGNOSIS — Z01.419 VISIT FOR PELVIC EXAM: ICD-10-CM

## 2024-02-29 DIAGNOSIS — Z01.419 WELL WOMAN EXAM WITH ROUTINE GYNECOLOGICAL EXAM: Primary | ICD-10-CM

## 2024-02-29 PROCEDURE — 99459 PELVIC EXAMINATION: CPT | Performed by: OBSTETRICS & GYNECOLOGY

## 2024-02-29 PROCEDURE — 99396 PREV VISIT EST AGE 40-64: CPT | Performed by: OBSTETRICS & GYNECOLOGY

## 2024-02-29 SDOH — ECONOMIC STABILITY: INCOME INSECURITY: HOW HARD IS IT FOR YOU TO PAY FOR THE VERY BASICS LIKE FOOD, HOUSING, MEDICAL CARE, AND HEATING?: PATIENT DECLINED

## 2024-02-29 SDOH — ECONOMIC STABILITY: HOUSING INSECURITY
IN THE LAST 12 MONTHS, WAS THERE A TIME WHEN YOU DID NOT HAVE A STEADY PLACE TO SLEEP OR SLEPT IN A SHELTER (INCLUDING NOW)?: PATIENT DECLINED

## 2024-02-29 NOTE — PROGRESS NOTES
Annual Exam  Established pt.     Dorothy Coats   48 y.o.  1975  [id number]    Today:   24                         Presents for well woman annual exam.  Reports:  Doing well, denies dyspareunia.     BC:   status post hysterectomy.    Past Medical History:   Diagnosis Date    Abnormal Pap smear of cervix     LEEP    Diabetes (HCC)     Type 2, Metformin and rybelsus, Average fasting- 100, Hypoglycemia signs at 70, Last A1c 5.6 2020    Headache(784.0)     Pap smear for cervical cancer screening     normal paps:  , 2015, 2016, 2017, 2018, ,     Type 2 diabetes mellitus without complication (HCC)        Past Surgical History:   Procedure Laterality Date    GYN          GYN       X1    HEENT      tonsilectomy    HYSTERECTOMY (CERVIX STATUS UNKNOWN)  2020    WISDOM TOOTH EXTRACTION  2004       Family History   Problem Relation Age of Onset    Premature Birth Son     Diabetes Paternal Uncle     Cancer Paternal Grandfather         brain tumor, [employer]    Heart Disease Paternal Grandfather     Alzheimer's Disease Paternal Grandmother     Elevated Lipids Brother     Obesity Brother     Hypertension Brother     Breast Cancer Paternal Aunt         teenage diagnosis, ????    Elevated Lipids Father     Dementia Father         christiano body dementia    Parkinsonism Father     Hypertension Maternal Grandfather     Heart Disease Maternal Grandfather     Heart Disease Maternal Grandmother     Other Mother         thyroid nodule    Heart Disease Mother     Prostate Cancer Neg Hx     Pulmonary Embolism Neg Hx     Colon Cancer Neg Hx     Deep Vein Thrombosis Neg Hx     Ovarian Cancer Neg Hx        OB History    Para Term  AB Living   1 1 0 1 0 1   SAB IAB Ectopic Molar Multiple Live Births   0 0 0 0 0 1      # Outcome Date GA Lbr Chris/2nd Weight Sex Delivery Anes PTL Lv   1  07 29w0d  1.276 kg (2 lb 13 oz) M CS-Unspec  Y OPHELIA      Complications: Abruptio

## 2024-05-28 ENCOUNTER — HOSPITAL ENCOUNTER (OUTPATIENT)
Dept: MAMMOGRAPHY | Age: 49
Discharge: HOME | End: 2024-05-31
Attending: OBSTETRICS & GYNECOLOGY
Payer: COMMERCIAL

## 2024-05-28 DIAGNOSIS — Z12.31 ENCOUNTER FOR SCREENING MAMMOGRAM FOR MALIGNANT NEOPLASM OF BREAST: ICD-10-CM

## 2024-05-28 DIAGNOSIS — Z01.419 WELL WOMAN EXAM WITH ROUTINE GYNECOLOGICAL EXAM: ICD-10-CM

## 2024-05-28 PROCEDURE — 77063 BREAST TOMOSYNTHESIS BI: CPT

## 2024-11-06 DIAGNOSIS — E78.2 MIXED HYPERLIPIDEMIA: ICD-10-CM

## 2024-11-06 RX ORDER — ATORVASTATIN CALCIUM 10 MG/1
10 TABLET, FILM COATED ORAL DAILY
Qty: 30 TABLET | Refills: 11 | Status: SHIPPED | OUTPATIENT
Start: 2024-11-06

## 2024-12-02 DIAGNOSIS — E11.9 TYPE 2 DIABETES MELLITUS WITHOUT COMPLICATION, WITH LONG-TERM CURRENT USE OF INSULIN (HCC): ICD-10-CM

## 2024-12-02 DIAGNOSIS — Z79.4 TYPE 2 DIABETES MELLITUS WITHOUT COMPLICATION, WITH LONG-TERM CURRENT USE OF INSULIN (HCC): ICD-10-CM

## 2024-12-04 RX ORDER — ORAL SEMAGLUTIDE 14 MG/1
14 TABLET ORAL DAILY
Qty: 30 TABLET | Refills: 11 | Status: SHIPPED | OUTPATIENT
Start: 2024-12-04

## 2025-01-09 ENCOUNTER — TELEPHONE (OUTPATIENT)
Dept: ENDOCRINOLOGY | Age: 50
End: 2025-01-09

## 2025-01-13 ENCOUNTER — TELEPHONE (OUTPATIENT)
Dept: ENDOCRINOLOGY | Age: 50
End: 2025-01-13

## 2025-01-14 NOTE — TELEPHONE ENCOUNTER
Faxed additional information to Maurice with chart notes for PA on [facility].

## 2025-02-07 DIAGNOSIS — E78.2 MIXED HYPERLIPIDEMIA: ICD-10-CM

## 2025-02-07 DIAGNOSIS — E11.9 TYPE 2 DIABETES MELLITUS WITHOUT COMPLICATION, WITH LONG-TERM CURRENT USE OF INSULIN (HCC): ICD-10-CM

## 2025-02-07 DIAGNOSIS — Z79.4 TYPE 2 DIABETES MELLITUS WITHOUT COMPLICATION, WITH LONG-TERM CURRENT USE OF INSULIN (HCC): ICD-10-CM

## 2025-02-07 LAB
ALBUMIN SERPL-MCNC: 3.8 G/DL (ref 3.5–5)
ALBUMIN/GLOB SERPL: 1.2 (ref 1–1.9)
ALP SERPL-CCNC: 88 U/L (ref 35–104)
ALT SERPL-CCNC: 16 U/L (ref 8–45)
ANION GAP SERPL CALC-SCNC: 10 MMOL/L (ref 7–16)
AST SERPL-CCNC: 21 U/L (ref 15–37)
BASOPHILS # BLD: 0.04 K/UL (ref 0–0.2)
BASOPHILS NFR BLD: 0.6 % (ref 0–2)
BILIRUB SERPL-MCNC: 0.3 MG/DL (ref 0–1.2)
BUN SERPL-MCNC: 13 MG/DL (ref 6–23)
CALCIUM SERPL-MCNC: 9.4 MG/DL (ref 8.8–10.2)
CHLORIDE SERPL-SCNC: 103 MMOL/L (ref 98–107)
CHOLEST SERPL-MCNC: 98 MG/DL (ref 0–200)
CO2 SERPL-SCNC: 25 MMOL/L (ref 20–29)
CREAT SERPL-MCNC: 0.79 MG/DL (ref 0.6–1.1)
CREAT UR-MCNC: 80.3 MG/DL (ref 28–217)
DIFFERENTIAL METHOD BLD: NORMAL
EOSINOPHIL # BLD: 0.27 K/UL (ref 0–0.8)
EOSINOPHIL NFR BLD: 4 % (ref 0.5–7.8)
ERYTHROCYTE [DISTWIDTH] IN BLOOD BY AUTOMATED COUNT: 12.4 % (ref 11.9–14.6)
EST. AVERAGE GLUCOSE BLD GHB EST-MCNC: 127 MG/DL
GLOBULIN SER CALC-MCNC: 3.1 G/DL (ref 2.3–3.5)
GLUCOSE SERPL-MCNC: 108 MG/DL (ref 70–99)
HBA1C MFR BLD: 6.1 % (ref 0–5.6)
HCT VFR BLD AUTO: 42 % (ref 35.8–46.3)
HDLC SERPL-MCNC: 33 MG/DL (ref 40–60)
HDLC SERPL: 2.9 (ref 0–5)
HGB BLD-MCNC: 13.6 G/DL (ref 11.7–15.4)
IMM GRANULOCYTES # BLD AUTO: 0.01 K/UL (ref 0–0.5)
IMM GRANULOCYTES NFR BLD AUTO: 0.1 % (ref 0–5)
LDLC SERPL CALC-MCNC: 46 MG/DL (ref 0–100)
LYMPHOCYTES # BLD: 2.23 K/UL (ref 0.5–4.6)
LYMPHOCYTES NFR BLD: 33.3 % (ref 13–44)
MCH RBC QN AUTO: 29.8 PG (ref 26.1–32.9)
MCHC RBC AUTO-ENTMCNC: 32.4 G/DL (ref 31.4–35)
MCV RBC AUTO: 92.1 FL (ref 82–102)
MICROALBUMIN UR-MCNC: <1.2 MG/DL (ref 0–20)
MICROALBUMIN/CREAT UR-RTO: NORMAL MG/G (ref 0–30)
MONOCYTES # BLD: 0.37 K/UL (ref 0.1–1.3)
MONOCYTES NFR BLD: 5.5 % (ref 4–12)
NEUTS SEG # BLD: 3.78 K/UL (ref 1.7–8.2)
NEUTS SEG NFR BLD: 56.5 % (ref 43–78)
NRBC # BLD: 0 K/UL (ref 0–0.2)
PLATELET # BLD AUTO: 227 K/UL (ref 150–450)
PMV BLD AUTO: 11 FL (ref 9.4–12.3)
POTASSIUM SERPL-SCNC: 4.5 MMOL/L (ref 3.5–5.1)
PROT SERPL-MCNC: 6.9 G/DL (ref 6.3–8.2)
RBC # BLD AUTO: 4.56 M/UL (ref 4.05–5.2)
SODIUM SERPL-SCNC: 139 MMOL/L (ref 136–145)
TRIGL SERPL-MCNC: 91 MG/DL (ref 0–150)
TSH W FREE THYROID IF ABNORMAL: 0.85 UIU/ML (ref 0.27–4.2)
VLDLC SERPL CALC-MCNC: 18 MG/DL (ref 6–23)
WBC # BLD AUTO: 6.7 K/UL (ref 4.3–11.1)

## 2025-02-13 ENCOUNTER — OFFICE VISIT (OUTPATIENT)
Dept: ENDOCRINOLOGY | Age: 50
End: 2025-02-13
Payer: COMMERCIAL

## 2025-02-13 VITALS
HEART RATE: 69 BPM | WEIGHT: 185.6 LBS | OXYGEN SATURATION: 99 % | HEIGHT: 63 IN | BODY MASS INDEX: 32.89 KG/M2 | SYSTOLIC BLOOD PRESSURE: 118 MMHG | DIASTOLIC BLOOD PRESSURE: 72 MMHG

## 2025-02-13 DIAGNOSIS — E11.9 TYPE 2 DIABETES MELLITUS TREATED WITHOUT INSULIN (HCC): Primary | ICD-10-CM

## 2025-02-13 DIAGNOSIS — E11.69 HYPERLIPIDEMIA ASSOCIATED WITH TYPE 2 DIABETES MELLITUS (HCC): ICD-10-CM

## 2025-02-13 DIAGNOSIS — E78.5 HYPERLIPIDEMIA ASSOCIATED WITH TYPE 2 DIABETES MELLITUS (HCC): ICD-10-CM

## 2025-02-13 PROCEDURE — 3044F HG A1C LEVEL LT 7.0%: CPT | Performed by: PHYSICIAN ASSISTANT

## 2025-02-13 PROCEDURE — 99214 OFFICE O/P EST MOD 30 MIN: CPT | Performed by: PHYSICIAN ASSISTANT

## 2025-02-13 NOTE — PROGRESS NOTES
rybelsus 14mg     Glucose data:  No data      Failed past therapies:     Relevant co morbidities:    Denies HX pancreatitis, DKA, gastroparesis, foot ulcer     Pregnancy: no plans, s/p hysterectomy       Optho:     Last eye exam was Dec 2024  and demonstrated no diabetic retinopathy.   Eye care specialist is [facility].  No required intervention      Obesity:         Body mass index is 32.88 kg/m².       increasing steadily      Wt Readings from Last 3 Encounters:   02/13/25 84.2 kg (185 lb 9.6 oz)   02/29/24 83 kg (183 lb)   11/08/23 82.6 kg (182 lb)         CardioVascular:    None=     Renal:    Under care of nephro? no    On ARB/ACE-I  This patient does not have an active medication from one of the medication groupers.       Not On Kerendia    01/23/2020      Cr 0.67,                            2/17/2020        Cr 0.77, GFR 94, microalbumin/Cr ratio <4                           06/24/2020      Cr 0.87, GFR 81                           08/05/2020      Cr 0.87, GFR 81                           06/29/2021      Cr 0.82, GFR 87                          02/18/2022      Cr 0.77, GFR 93                          05/19/2022      Cr 0.81, GFR 91, microalbumin/Cr ratio 3                          07/27/2023      Cr 0.89, GFR 80    02/07/2025 Cr 0.79, GFR >90, microalbumin/Cr ratio Cannot calculate ratio due to microalbumin result outside reportable range         Lipids:     Current therapy atorvastatin - 10 MG with good compliance  11/09/2021  TC- 175, LDL- 107, VLDL- 30,  HDL- 38, TG- 172              02/18/2022  TC- 112, LDL- 59, VLDL- 18,  HDL- 35, TG- 91              06/23/2022  TC- 99, LDL- 42, VLDL- 17,  HDL- 40, TG- 87              07/27/2023  TC- 97, LDL- 44, VLDL- 18,  HDL- 35, TG- 92    02/07/2025  TC- 98, LDL- 46, VLDL- 18,  HDL- 33, TG- 91        Lab Results   Component Value Date    CHOL 98 02/07/2025    CHOL 112 02/18/2022    CHOL 175 11/09/2021     No components found for: \"LDLCHOLESTEROL\", \"LDLCALC\"

## 2025-03-05 SDOH — ECONOMIC STABILITY: TRANSPORTATION INSECURITY
IN THE PAST 12 MONTHS, HAS LACK OF TRANSPORTATION KEPT YOU FROM MEETINGS, WORK, OR FROM GETTING THINGS NEEDED FOR DAILY LIVING?: PATIENT DECLINED

## 2025-03-05 SDOH — ECONOMIC STABILITY: FOOD INSECURITY: WITHIN THE PAST 12 MONTHS, YOU WORRIED THAT YOUR FOOD WOULD RUN OUT BEFORE YOU GOT MONEY TO BUY MORE.: PATIENT DECLINED

## 2025-03-05 SDOH — ECONOMIC STABILITY: TRANSPORTATION INSECURITY
IN THE PAST 12 MONTHS, HAS THE LACK OF TRANSPORTATION KEPT YOU FROM MEDICAL APPOINTMENTS OR FROM GETTING MEDICATIONS?: PATIENT DECLINED

## 2025-03-05 SDOH — ECONOMIC STABILITY: INCOME INSECURITY: IN THE LAST 12 MONTHS, WAS THERE A TIME WHEN YOU WERE NOT ABLE TO PAY THE MORTGAGE OR RENT ON TIME?: PATIENT DECLINED

## 2025-03-05 SDOH — ECONOMIC STABILITY: FOOD INSECURITY: WITHIN THE PAST 12 MONTHS, THE FOOD YOU BOUGHT JUST DIDN'T LAST AND YOU DIDN'T HAVE MONEY TO GET MORE.: PATIENT DECLINED

## 2025-03-06 ENCOUNTER — OFFICE VISIT (OUTPATIENT)
Dept: OBGYN CLINIC | Age: 50
End: 2025-03-06
Payer: COMMERCIAL

## 2025-03-06 VITALS
SYSTOLIC BLOOD PRESSURE: 124 MMHG | HEIGHT: 63 IN | DIASTOLIC BLOOD PRESSURE: 80 MMHG | WEIGHT: 185.6 LBS | BODY MASS INDEX: 32.89 KG/M2

## 2025-03-06 DIAGNOSIS — Z11.51 SCREENING FOR HPV (HUMAN PAPILLOMAVIRUS): ICD-10-CM

## 2025-03-06 DIAGNOSIS — Z01.419 WELL WOMAN EXAM WITH ROUTINE GYNECOLOGICAL EXAM: ICD-10-CM

## 2025-03-06 DIAGNOSIS — K64.8 HEMORRHOID PROLAPSE: ICD-10-CM

## 2025-03-06 DIAGNOSIS — Z12.4 SCREENING FOR CERVICAL CANCER: ICD-10-CM

## 2025-03-06 DIAGNOSIS — Z12.39 SCREENING BREAST EXAMINATION: ICD-10-CM

## 2025-03-06 DIAGNOSIS — Z12.31 ENCOUNTER FOR SCREENING MAMMOGRAM FOR MALIGNANT NEOPLASM OF BREAST: Primary | ICD-10-CM

## 2025-03-06 PROCEDURE — 99396 PREV VISIT EST AGE 40-64: CPT | Performed by: OBSTETRICS & GYNECOLOGY

## 2025-03-06 PROCEDURE — 99459 PELVIC EXAMINATION: CPT | Performed by: OBSTETRICS & GYNECOLOGY

## 2025-03-06 RX ORDER — HYDROCORTISONE ACETATE PRAMOXINE HCL 1; 1 G/100G; G/100G
CREAM TOPICAL
Qty: 30 G | Refills: 2 | Status: SHIPPED | OUTPATIENT
Start: 2025-03-06

## 2025-03-06 SDOH — ECONOMIC STABILITY: FOOD INSECURITY: WITHIN THE PAST 12 MONTHS, THE FOOD YOU BOUGHT JUST DIDN'T LAST AND YOU DIDN'T HAVE MONEY TO GET MORE.: NEVER TRUE

## 2025-03-06 SDOH — ECONOMIC STABILITY: FOOD INSECURITY: WITHIN THE PAST 12 MONTHS, YOU WORRIED THAT YOUR FOOD WOULD RUN OUT BEFORE YOU GOT MONEY TO BUY MORE.: NEVER TRUE

## 2025-03-06 ASSESSMENT — PATIENT HEALTH QUESTIONNAIRE - PHQ9
SUM OF ALL RESPONSES TO PHQ QUESTIONS 1-9: 0
2. FEELING DOWN, DEPRESSED OR HOPELESS: NOT AT ALL
SUM OF ALL RESPONSES TO PHQ QUESTIONS 1-9: 0
1. LITTLE INTEREST OR PLEASURE IN DOING THINGS: NOT AT ALL

## 2025-04-23 ENCOUNTER — TELEPHONE (OUTPATIENT)
Dept: ENDOCRINOLOGY | Age: 50
End: 2025-04-23

## 2025-04-30 DIAGNOSIS — E11.9 TYPE 2 DIABETES MELLITUS WITHOUT COMPLICATION, WITH LONG-TERM CURRENT USE OF INSULIN (HCC): ICD-10-CM

## 2025-04-30 DIAGNOSIS — Z79.4 TYPE 2 DIABETES MELLITUS WITHOUT COMPLICATION, WITH LONG-TERM CURRENT USE OF INSULIN (HCC): ICD-10-CM

## 2025-04-30 RX ORDER — SEMAGLUTIDE 0.68 MG/ML
0.5 INJECTION, SOLUTION SUBCUTANEOUS
Qty: 9 ML | Refills: 3 | Status: SHIPPED | OUTPATIENT
Start: 2025-04-30

## 2025-04-30 RX ORDER — SEMAGLUTIDE 0.68 MG/ML
INJECTION, SOLUTION SUBCUTANEOUS
Qty: 9 ML | Refills: 3 | OUTPATIENT
Start: 2025-04-30

## 2025-05-29 ENCOUNTER — HOSPITAL ENCOUNTER (OUTPATIENT)
Dept: MAMMOGRAPHY | Age: 50
Discharge: HOME | End: 2025-05-29
Attending: OBSTETRICS & GYNECOLOGY
Payer: COMMERCIAL

## 2025-05-29 DIAGNOSIS — Z12.31 ENCOUNTER FOR SCREENING MAMMOGRAM FOR MALIGNANT NEOPLASM OF BREAST: ICD-10-CM

## 2025-05-29 PROCEDURE — 77063 BREAST TOMOSYNTHESIS BI: CPT

## (undated) DEVICE — DRAPE,UNDERBUTTOCKS,PCH,STERILE: Brand: MEDLINE

## (undated) DEVICE — SOLUTION IRRIG 3000ML 0.9% SOD CHL FLX CONT 0797208] ICU MEDICAL INC]

## (undated) DEVICE — TRAY PREP DRY W/ PREM GLV 2 APPL 6 SPNG 2 UNDPD 1 OVERWRAP

## (undated) DEVICE — DELINEATOR MANIPULATOR 3.0CM -- ADVINCULA

## (undated) DEVICE — 2000CC GUARDIAN II: Brand: GUARDIAN

## (undated) DEVICE — GARMENT,MEDLINE,DVT,INT,CALF,MED, GEN2: Brand: MEDLINE

## (undated) DEVICE — SUTURE VCRL SZ 0 L27IN ABSRB UD L36MM CT-1 1/2 CIR J260H

## (undated) DEVICE — DRAPE SHT 3 QTR PROXIMA 53X77 --

## (undated) DEVICE — SOLUTION IV 1000ML 0.9% SOD CHL

## (undated) DEVICE — REM POLYHESIVE ADULT PATIENT RETURN ELECTRODE: Brand: VALLEYLAB

## (undated) DEVICE — SINGLE BASIN: Brand: CARDINAL HEALTH

## (undated) DEVICE — 40585 XL ADVANCED TRENDELENBURG POSITIONING KIT: Brand: 40585 XL ADVANCED TRENDELENBURG POSITIONING KIT

## (undated) DEVICE — DRAPE TWL SURG 16X26IN BLU ORB04] ALLCARE INC]

## (undated) DEVICE — TROCAR: Brand: KII® SLEEVE

## (undated) DEVICE — SEALER ENDOSCP L37CM NANO COAT BLNT TIP LAP DIV

## (undated) DEVICE — SUTURE V-LOC 180 SZ 0 L9IN ABSRB GRN GS-21 L37MM 1/2 CIR VLOCL0346

## (undated) DEVICE — BLUNT DISSECTOR: Brand: ENDO PEANUT

## (undated) DEVICE — 2, DISPOSABLE SUCTION/IRRIGATOR WITHOUT DISPOSABLE TIP: Brand: STRYKEFLOW

## (undated) DEVICE — TROCAR: Brand: KII FIOS FIRST ENTRY

## (undated) DEVICE — TOTAL TRAY, DB, 100% SILI FOLEY, 16FR 10: Brand: MEDLINE

## (undated) DEVICE — KIT,ANTI FOG,W/SPONGE & FLUID,SOFT PACK: Brand: MEDLINE

## (undated) DEVICE — DERMABOND SKIN ADH 0.7ML -- DERMABOND ADVANCED 12/BX

## (undated) DEVICE — [HIGH FLOW INSUFFLATOR,  DO NOT USE IF PACKAGE IS DAMAGED,  KEEP DRY,  KEEP AWAY FROM SUNLIGHT,  PROTECT FROM HEAT AND RADIOACTIVE SOURCES.]: Brand: PNEUMOSURE

## (undated) DEVICE — CONTAINER SPEC HISTOLOGY 900ML POLYPR

## (undated) DEVICE — SHEARS ENDOSCP L36CM DIA5MM ULTRASONIC CRV TIP W/ ADV

## (undated) DEVICE — GYN LAPAROSCOPY: Brand: MEDLINE INDUSTRIES, INC.

## (undated) DEVICE — (D)PREP SKN CHLRAPRP APPL 26ML -- CONVERT TO ITEM 371833

## (undated) DEVICE — SUTURE MCRYL SZ 4-0 L27IN ABSRB UD L19MM PS-2 1/2 CIR PRIM Y426H

## (undated) DEVICE — INSUFFLATION NEEDLE TO ESTABLISH PNEUMOPERITONEUM.: Brand: INSUFFLATION NEEDLE